# Patient Record
Sex: FEMALE | Race: WHITE | NOT HISPANIC OR LATINO | Employment: PART TIME | URBAN - METROPOLITAN AREA
[De-identification: names, ages, dates, MRNs, and addresses within clinical notes are randomized per-mention and may not be internally consistent; named-entity substitution may affect disease eponyms.]

---

## 2018-05-15 ENCOUNTER — OFFICE VISIT CONVERTED (OUTPATIENT)
Dept: FAMILY MEDICINE CLINIC | Facility: CLINIC | Age: 28
End: 2018-05-15
Attending: NURSE PRACTITIONER

## 2018-08-24 ENCOUNTER — CONVERSION ENCOUNTER (OUTPATIENT)
Dept: FAMILY MEDICINE CLINIC | Facility: CLINIC | Age: 28
End: 2018-08-24

## 2018-08-24 ENCOUNTER — OFFICE VISIT CONVERTED (OUTPATIENT)
Dept: FAMILY MEDICINE CLINIC | Facility: CLINIC | Age: 28
End: 2018-08-24
Attending: NURSE PRACTITIONER

## 2018-11-06 ENCOUNTER — OFFICE VISIT CONVERTED (OUTPATIENT)
Dept: FAMILY MEDICINE CLINIC | Facility: CLINIC | Age: 28
End: 2018-11-06
Attending: NURSE PRACTITIONER

## 2020-02-11 ENCOUNTER — OFFICE VISIT CONVERTED (OUTPATIENT)
Dept: FAMILY MEDICINE CLINIC | Facility: CLINIC | Age: 30
End: 2020-02-11
Attending: NURSE PRACTITIONER

## 2020-02-11 ENCOUNTER — HOSPITAL ENCOUNTER (OUTPATIENT)
Dept: LAB | Facility: HOSPITAL | Age: 30
Discharge: HOME | End: 2020-02-11
Attending: NURSE PRACTITIONER

## 2020-02-12 LAB
CONV MUMPS ANTIBODY IGG: 27.4 AU/ML
HBV SURFACE AB SER QL: REACTIVE
MEV IGG SER IA-ACNC: 271 AU/ML
RUBV IGG SER-ACNC: 34.16 [IU]/ML
VZV IGG SER IA-ACNC: >4000 INDEX

## 2020-06-04 ENCOUNTER — HOSPITAL ENCOUNTER (OUTPATIENT)
Dept: OTHER | Facility: HOSPITAL | Age: 30
Discharge: HOME | End: 2020-06-04
Attending: NURSE PRACTITIONER

## 2020-06-05 ENCOUNTER — CONVERSION ENCOUNTER (OUTPATIENT)
Dept: FAMILY MEDICINE CLINIC | Facility: CLINIC | Age: 30
End: 2020-06-05

## 2020-06-05 ENCOUNTER — OFFICE VISIT CONVERTED (OUTPATIENT)
Dept: FAMILY MEDICINE CLINIC | Facility: CLINIC | Age: 30
End: 2020-06-05
Attending: NURSE PRACTITIONER

## 2020-06-06 LAB
CONV MUMPS ANTIBODY IGG: 28.4 AU/ML
HBV SURFACE AB SER QL: REACTIVE
MEV IGG SER IA-ACNC: >300 AU/ML
RUBV IGG SER-ACNC: 32.27 [IU]/ML
VZV IGG SER IA-ACNC: 2016 INDEX

## 2021-05-13 NOTE — PROGRESS NOTES
"   Progress Note      Patient Name: Loretta Layton   Patient ID: [id number]   Sex: Female   YOB: 1990        Visit Date: 2020    Provider: ARIS Cevallos   Location: [facility]   Location Address: [address] [place] [address], [address] [place] [address]  [id number]   Location Phone: [phone]          Chief Complaint  · School CPX      History Of Present Illness  Loretta Layton is a 30 year old /White female who presents for evaluation and treatment of:      Patient presents to the office today for a physical required by her school.  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  She denies needing any immunizations at this time.  She states that she did have her PPD.  She states that she will notify the office if there is any other requirements that she is needing.  She denies any other concerns or complaints at this time       Past Medical History  Broken Bones; Head injury; Ketonuria         Past Surgical History  *No Past Surgical History         Allergy List  NO KNOWN DRUG ALLERGIES         Family Medical History  Stroke; TIA         Reproductive History   2 Para 0 0 0 2       Social History  Alcohol (Never); Denies substance abuse; ; Moderate Amount of Exercise (1-3 times weekly); Tobacco (Never)         Immunizations  Name Date Admin   Tdap          Review of Systems  · Constitutional  o Denies  o : fever, fatigue, weight loss, weight gain  · Cardiovascular  o Denies  o : lower extremity edema, claudication, chest pressure, palpitations  · Respiratory  o Denies  o : shortness of breath, wheezing, cough, hemoptysis, dyspnea on exertion  · Gastrointestinal  o Denies  o : nausea, vomiting, diarrhea, constipation, abdominal pain      Vitals  Date Time BP Position Site L\R Cuff Size HR RR TEMP (F) WT  HT  BMI kg/m2 BSA m2 O2 Sat        2020 03:03 /78 Sitting    77 - R 16 98.2 189lbs 0oz 5'  1\" 35.71 1.92 96 %          Physical " Examination  · Constitutional  o Appearance  o : well-nourished, in no acute distress  · Eyes  o Conjunctivae  o : conjunctivae normal  o Sclerae  o : sclerae white  o Pupils and Irises  o : pupils equal and round  o Eyelids/Ocular Adnexae  o : eyelid appearance normal, no exudates present  · Ears, Nose, Mouth and Throat  o Ears  o :   § External Ears  § : external auditory canal appearance within normal limits, no discharge present  § Otoscopic Examination  § : tympanic membrane appearance within normal limits bilaterally, cerumen not present  o Nose  o :   § External Nose  § : appearance normal  § Intranasal Exam  § : mucosa within normal limits, vestibules normal, no intranasal lesions present, septum midline, sinuses non tender to palpation  § Nasopharynx  § : no discharge present  o Oral Cavity  o :   § Oral Mucosa  § : oral mucosa normal  § Lips  § : lip appearance normal  § Teeth  § : normal dentation for age  o Throat  o :   § Oropharynx  § : no inflammation or lesions present, tonsils within normal limits  · Neck  o Inspection/Palpation  o : normal appearance, no masses or tenderness, trachea midline  o Range of Motion  o : cervical range of motion within normal limits  o Thyroid  o : gland size normal, nontender, no nodules or masses present on palpation  · Respiratory  o Respiratory Effort  o : breathing unlabored  o Inspection of Chest  o : normal appearance  o Auscultation of Lungs  o : normal breath sounds throughout inspiration and expiration  · Cardiovascular  o Heart  o :   § Auscultation of Heart  § : regular rate and rhythm, no murmurs, gallops or rubs  o Peripheral Vascular System  o :   § Extremities  § : no clubbing or edema  · Gastrointestinal  o Abdominal Examination  o : abdomen nontender to palpation, tone normal without rigidity or guarding, no masses present, bowel sounds present in all four quadrants  · Lymphatic  o Neck  o : no lymphadenopathy present  · Musculoskeletal  o Spine  o :    § Inspection/Palpation  § : no spinal tenderness, scoliosis or kyphosis present  § Range of Motion  § : spine range of motion normal  o Right Upper Extremity  o :   § Inspection/Palpation  § : no tenderness to palpation, ROM normal  o Left Upper Extremity  o :   § Inspection/Palpation  § : no tenderness to palpation, ROM normal  o Right Lower Extremity  o :   § Inspection/Palpation  § : no joint or limb tenderness to palpation, ROM normal  o Left Lower Extremity  o :   § Inspection/Palpation  § : no joint or limb tenderness to palpation, ROM normal  · Skin and Subcutaneous Tissue  o General Inspection  o : no rashes or lesions present, no areas of discoloration  o Body Hair  o : hair normal for age, general body hair distribution normal for age  o Digits and Nails  o : no clubbing, cyanosis, deformities or edema present, normal appearing nails  · Neurologic  o Mental Status Examination  o :   § Orientation  § : grossly oriented to person, place and time  o Gait and Station  o : normal gait, able to stand without difficulty  · Psychiatric  o Judgement and Insight  o : judgment and insight intact  o Mood and Affect  o : mood normal, affect appropriate  o Presence of Abnormal Thoughts  o : no hallucinations, no delusions present, no psychotic thoughts          Results  · In-Office Procedures  o Medical procedure  § IOP - Snellen Vision Test (65942)   § Wearing glasses or contacts?: No   § OS (Left): 20/13   § OD (Right): 20/20       Assessment  · Well adult exam     V70.0/Z00.00      Plan  · Orders  o ACO-39: Current medications updated and reviewed () - - 06/05/2020  o ACO-14: Influenza immunization was not administered for reasons documented () - - 06/05/2020  · Medications  o Medications have been Reconciled  o Transition of Care or Provider Policy  · Instructions  o Patient was educated/instructed on their diagnosis, treatment and medications prior to discharge from the clinic today.  o Time spent with  the patient was minutes, more than 50% face to face.  o Electronically Identified Patient Education Materials Provided Electronically  · Disposition  o Call or Return if symptoms worsen or persist.            Electronically Signed by: ARIS Cevallos -Author on June 5, 2020 03:54:43 PM

## 2021-05-15 VITALS
SYSTOLIC BLOOD PRESSURE: 118 MMHG | OXYGEN SATURATION: 95 % | HEART RATE: 115 BPM | BODY MASS INDEX: 33.23 KG/M2 | WEIGHT: 176 LBS | DIASTOLIC BLOOD PRESSURE: 74 MMHG | RESPIRATION RATE: 16 BRPM | TEMPERATURE: 97.9 F | HEIGHT: 61 IN

## 2021-05-15 VITALS
SYSTOLIC BLOOD PRESSURE: 116 MMHG | WEIGHT: 189 LBS | HEIGHT: 61 IN | RESPIRATION RATE: 16 BRPM | DIASTOLIC BLOOD PRESSURE: 78 MMHG | HEART RATE: 77 BPM | OXYGEN SATURATION: 96 % | BODY MASS INDEX: 35.68 KG/M2 | TEMPERATURE: 98.2 F

## 2021-05-16 VITALS
BODY MASS INDEX: 32.28 KG/M2 | RESPIRATION RATE: 16 BRPM | DIASTOLIC BLOOD PRESSURE: 84 MMHG | SYSTOLIC BLOOD PRESSURE: 121 MMHG | WEIGHT: 171 LBS | TEMPERATURE: 98.7 F | HEIGHT: 61 IN | OXYGEN SATURATION: 97 % | HEART RATE: 90 BPM

## 2021-05-16 VITALS
HEIGHT: 61 IN | SYSTOLIC BLOOD PRESSURE: 132 MMHG | WEIGHT: 175.31 LBS | RESPIRATION RATE: 16 BRPM | DIASTOLIC BLOOD PRESSURE: 79 MMHG | OXYGEN SATURATION: 97 % | TEMPERATURE: 97.5 F | HEART RATE: 88 BPM | BODY MASS INDEX: 33.1 KG/M2

## 2021-06-16 ENCOUNTER — TELEPHONE (OUTPATIENT)
Dept: FAMILY MEDICINE CLINIC | Facility: CLINIC | Age: 31
End: 2021-06-16

## 2021-06-16 NOTE — TELEPHONE ENCOUNTER
Caller: Loretta Layton    Relationship to patient: Self    Best call back number: [phone]    Chief complaint: PATIENT NEEDS A TUBERCULIN SKIN TEST DONE BEFORE 06/30/2021. PATIENT THOUGHT THAT SHE COULD WALK IN BUT WAS TOLD TO MAKE AN APPOINTMENT.    Type of visit: OFFICE VISIT    Requested date: 06/16/2021    If rescheduling, when is the original appointment: N/A    Additional notes: PATIENT IS REQUESTING A CALLBACK

## 2022-04-06 ENCOUNTER — LAB (OUTPATIENT)
Dept: LAB | Facility: HOSPITAL | Age: 32
End: 2022-04-06

## 2022-04-06 ENCOUNTER — OFFICE VISIT (OUTPATIENT)
Dept: FAMILY MEDICINE CLINIC | Facility: CLINIC | Age: 32
End: 2022-04-06

## 2022-04-06 VITALS
TEMPERATURE: 98.6 F | RESPIRATION RATE: 18 BRPM | BODY MASS INDEX: 35.95 KG/M2 | HEIGHT: 61 IN | WEIGHT: 190.4 LBS | DIASTOLIC BLOOD PRESSURE: 70 MMHG | HEART RATE: 75 BPM | OXYGEN SATURATION: 98 % | SYSTOLIC BLOOD PRESSURE: 121 MMHG

## 2022-04-06 DIAGNOSIS — M54.9 OTHER ACUTE BACK PAIN: ICD-10-CM

## 2022-04-06 DIAGNOSIS — Z13.220 SCREENING FOR LIPID DISORDERS: ICD-10-CM

## 2022-04-06 DIAGNOSIS — Z13.29 SCREENING FOR THYROID DISORDER: ICD-10-CM

## 2022-04-06 DIAGNOSIS — M62.830 BACK MUSCLE SPASM: Primary | ICD-10-CM

## 2022-04-06 DIAGNOSIS — M54.9 BACK PAIN DUE TO INJURY: ICD-10-CM

## 2022-04-06 PROBLEM — R82.4 KETONURIA: Status: ACTIVE | Noted: 2022-04-06

## 2022-04-06 PROBLEM — R82.4 KETONURIA: Status: RESOLVED | Noted: 2022-04-06 | Resolved: 2022-04-06

## 2022-04-06 PROBLEM — S09.90XA HEAD INJURY: Status: ACTIVE | Noted: 2022-04-06

## 2022-04-06 PROBLEM — T14.8XXA BROKEN BONES: Status: ACTIVE | Noted: 2022-04-06

## 2022-04-06 PROBLEM — T14.8XXA BROKEN BONES: Status: RESOLVED | Noted: 2022-04-06 | Resolved: 2022-04-06

## 2022-04-06 LAB
ALBUMIN SERPL-MCNC: 4 G/DL (ref 3.5–5.2)
ALBUMIN/GLOB SERPL: 0.9 G/DL
ALP SERPL-CCNC: 78 U/L (ref 39–117)
ALT SERPL W P-5'-P-CCNC: 23 U/L (ref 1–33)
ANION GAP SERPL CALCULATED.3IONS-SCNC: 9.5 MMOL/L (ref 5–15)
AST SERPL-CCNC: 19 U/L (ref 1–32)
BASOPHILS # BLD AUTO: 0.03 10*3/MM3 (ref 0–0.2)
BASOPHILS NFR BLD AUTO: 0.6 % (ref 0–1.5)
BILIRUB SERPL-MCNC: 0.2 MG/DL (ref 0–1.2)
BUN SERPL-MCNC: 14 MG/DL (ref 6–20)
BUN/CREAT SERPL: 21.5 (ref 7–25)
CALCIUM SPEC-SCNC: 9 MG/DL (ref 8.6–10.5)
CHLORIDE SERPL-SCNC: 106 MMOL/L (ref 98–107)
CHOLEST SERPL-MCNC: 219 MG/DL (ref 0–200)
CO2 SERPL-SCNC: 21.5 MMOL/L (ref 22–29)
CREAT SERPL-MCNC: 0.65 MG/DL (ref 0.57–1)
DEPRECATED RDW RBC AUTO: 39.1 FL (ref 37–54)
EGFRCR SERPLBLD CKD-EPI 2021: 119.4 ML/MIN/1.73
EOSINOPHIL # BLD AUTO: 0.2 10*3/MM3 (ref 0–0.4)
EOSINOPHIL NFR BLD AUTO: 4.3 % (ref 0.3–6.2)
ERYTHROCYTE [DISTWIDTH] IN BLOOD BY AUTOMATED COUNT: 12.2 % (ref 12.3–15.4)
GLOBULIN UR ELPH-MCNC: 4.3 GM/DL
GLUCOSE SERPL-MCNC: 91 MG/DL (ref 65–99)
HCT VFR BLD AUTO: 36.9 % (ref 34–46.6)
HDLC SERPL-MCNC: 55 MG/DL (ref 40–60)
HGB BLD-MCNC: 12 G/DL (ref 12–15.9)
IMM GRANULOCYTES # BLD AUTO: 0.01 10*3/MM3 (ref 0–0.05)
IMM GRANULOCYTES NFR BLD AUTO: 0.2 % (ref 0–0.5)
LDLC SERPL CALC-MCNC: 152 MG/DL (ref 0–100)
LDLC/HDLC SERPL: 2.73 {RATIO}
LYMPHOCYTES # BLD AUTO: 1.54 10*3/MM3 (ref 0.7–3.1)
LYMPHOCYTES NFR BLD AUTO: 33.2 % (ref 19.6–45.3)
MCH RBC QN AUTO: 28.4 PG (ref 26.6–33)
MCHC RBC AUTO-ENTMCNC: 32.5 G/DL (ref 31.5–35.7)
MCV RBC AUTO: 87.4 FL (ref 79–97)
MONOCYTES # BLD AUTO: 0.38 10*3/MM3 (ref 0.1–0.9)
MONOCYTES NFR BLD AUTO: 8.2 % (ref 5–12)
NEUTROPHILS NFR BLD AUTO: 2.48 10*3/MM3 (ref 1.7–7)
NEUTROPHILS NFR BLD AUTO: 53.5 % (ref 42.7–76)
NRBC BLD AUTO-RTO: 0 /100 WBC (ref 0–0.2)
PLATELET # BLD AUTO: 175 10*3/MM3 (ref 140–450)
PMV BLD AUTO: 11.9 FL (ref 6–12)
POTASSIUM SERPL-SCNC: 4.6 MMOL/L (ref 3.5–5.2)
PROT SERPL-MCNC: 8.3 G/DL (ref 6–8.5)
RBC # BLD AUTO: 4.22 10*6/MM3 (ref 3.77–5.28)
SODIUM SERPL-SCNC: 137 MMOL/L (ref 136–145)
TRIGL SERPL-MCNC: 68 MG/DL (ref 0–150)
TSH SERPL DL<=0.05 MIU/L-ACNC: 2.52 UIU/ML (ref 0.27–4.2)
VLDLC SERPL-MCNC: 12 MG/DL (ref 5–40)
WBC NRBC COR # BLD: 4.64 10*3/MM3 (ref 3.4–10.8)

## 2022-04-06 PROCEDURE — 99204 OFFICE O/P NEW MOD 45 MIN: CPT

## 2022-04-06 PROCEDURE — 80050 GENERAL HEALTH PANEL: CPT

## 2022-04-06 PROCEDURE — 80061 LIPID PANEL: CPT

## 2022-04-06 PROCEDURE — 36415 COLL VENOUS BLD VENIPUNCTURE: CPT

## 2022-04-06 RX ORDER — CYCLOBENZAPRINE HCL 10 MG
10 TABLET ORAL 3 TIMES DAILY PRN
Qty: 30 TABLET | Refills: 0 | Status: SHIPPED | OUTPATIENT
Start: 2022-04-06 | End: 2022-04-16

## 2022-04-06 NOTE — PROGRESS NOTES
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    • Establish Care     Was going to [facility].        Subjective          Loretta Layton presents to [facility]    She is here today to establish care.     She was previously with Bishnu Mcmahon.     She does not smoke. She states she did smoke here and there as a teenager. She has two children ages 15 and 12 that she had vaginally delivered. She is . She has no history of any surgeries.     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 She is having some constant pain in her right back/side area. She states she is having trouble sleeping at night due to the pain.       Past History:  Medical History: has a past medical history of Anxiety, Broken bones (4/6/2022), and Ketonuria (4/6/2022).   Surgical History: has no past surgical history on file.   Family History: family history is not on file.   Social History: reports that she has never smoked. She has never used smokeless tobacco. She reports previous alcohol use. She reports that she does not use drugs.  Allergies: Patient has no known allergies.  (Not in a hospital admission)       Social History     Socioeconomic History   • Marital status:    Tobacco Use   • Smoking status: Never Smoker   • Smokeless tobacco: Never Used   Substance and Sexual Activity   • Alcohol use: Not Currently   • Drug use: Never   • Sexual activity: Defer       There are no preventive care reminders to display for this patient.    Objective     Vital Signs:   /70 (BP Location: Left arm, Patient Position: Sitting)   Pulse 75   Temp 98.6 °F (37 °C) (Temporal)   Resp 18   Ht 154.9 cm (61\")   Wt 86.4 kg (190 lb 6.4 oz)   SpO2 98%   BMI 35.98 kg/m²       Physical Exam  Constitutional:       Appearance: Normal appearance. "   HENT:      Nose: Nose normal.      Mouth/Throat:      Mouth: Mucous membranes are moist.   Cardiovascular:      Rate and Rhythm: Normal rate and regular rhythm.      Pulses: Normal pulses.      Heart sounds: Normal heart sounds.   Pulmonary:      Effort: Pulmonary effort is normal.      Breath sounds: Normal breath sounds.   Musculoskeletal:         General: Signs of injury (back spasm) present.   Skin:     General: Skin is warm and dry.   Neurological:      General: No focal deficit present.      Mental Status: She is alert and oriented to person, place, and time.   Psychiatric:         Mood and Affect: Mood normal.         Behavior: Behavior normal.          Review of Systems   Musculoskeletal: Positive for arthralgias.        Result Review :                 Assessment and Plan    Diagnoses and all orders for this visit:    1. Back muscle spasm (Primary)  -     cyclobenzaprine (FLEXERIL) 10 MG tablet; Take 1 tablet by mouth 3 (Three) Times a Day As Needed for Muscle Spasms for up to 10 days.  Dispense: 30 tablet; Refill: 0    2. Other acute back pain  -     cyclobenzaprine (FLEXERIL) 10 MG tablet; Take 1 tablet by mouth 3 (Three) Times a Day As Needed for Muscle Spasms for up to 10 days.  Dispense: 30 tablet; Refill: 0  -     CBC & Differential; Future  -     Comprehensive Metabolic Panel; Future    3. Back pain due to injury    4. Screening for lipid disorders  -     Lipid Panel; Future    5. Screening for thyroid disorder  -     TSH; Future    I am sending her Flexeril for back pain due to strain. She is having the ongoing muscle pain.         Pt thought to be clinically stable at this time.    Follow Up   Return if symptoms worsen or fail to improve.  Patient was given instructions and counseling regarding her condition or for health maintenance advice. Please see specific information pulled into the AVS if appropriate.

## 2022-04-07 ENCOUNTER — PATIENT ROUNDING (BHMG ONLY) (OUTPATIENT)
Dept: FAMILY MEDICINE CLINIC | Facility: CLINIC | Age: 32
End: 2022-04-07

## 2022-04-07 NOTE — PROGRESS NOTES
April 7, 2022    Hello, may I speak with Loretta Layton?    My name is Chanel    I am  with [facility]  [address] [place] [id number] [phone].    Before we get started may I verify your date of birth? 1990    I am calling to officially welcome you to our practice and ask about your recent visit. Is this a good time to talk? Left voicemail    Tell me about your visit with us. What things went well?        We're always looking for ways to make our patients' experiences even better. Do you have recommendations on ways we may improve?    Overall were you satisfied with your first visit to our practice?        I appreciate you taking the time to speak with me today. Is there anything else I can do for you?       Thank you, and have a great day.

## 2022-04-08 NOTE — PROGRESS NOTES
Let her know her cholesterol is slightly elevated at 218 and her ldl is elevated at 152. She should make some lifestyle modifications to include healthier diet and exercise. We will recheck this in about 6 months to assess need for cholesterol medication

## 2022-09-09 ENCOUNTER — OFFICE VISIT (OUTPATIENT)
Dept: FAMILY MEDICINE CLINIC | Facility: CLINIC | Age: 32
End: 2022-09-09

## 2022-09-09 VITALS
OXYGEN SATURATION: 97 % | BODY MASS INDEX: 33.49 KG/M2 | TEMPERATURE: 97.7 F | DIASTOLIC BLOOD PRESSURE: 81 MMHG | SYSTOLIC BLOOD PRESSURE: 128 MMHG | HEART RATE: 94 BPM | WEIGHT: 177.4 LBS | HEIGHT: 61 IN

## 2022-09-09 DIAGNOSIS — F41.9 ANXIETY: Primary | ICD-10-CM

## 2022-09-09 DIAGNOSIS — R05.9 COUGH: ICD-10-CM

## 2022-09-09 PROCEDURE — 99214 OFFICE O/P EST MOD 30 MIN: CPT

## 2022-09-09 RX ORDER — BENZONATATE 100 MG/1
100 CAPSULE ORAL 3 TIMES DAILY PRN
Qty: 21 CAPSULE | Refills: 0 | Status: SHIPPED | OUTPATIENT
Start: 2022-09-09 | End: 2022-09-16

## 2022-09-09 RX ORDER — HYDROXYZINE HYDROCHLORIDE 25 MG/1
25 TABLET, FILM COATED ORAL 3 TIMES DAILY PRN
Qty: 90 TABLET | Refills: 0 | Status: SHIPPED | OUTPATIENT
Start: 2022-09-09 | End: 2022-10-09

## 2022-09-09 NOTE — PROGRESS NOTES
"Chief Complaint   Patient presents with   • Anxiety     Wants to discuss anxiety medications.        Subjective          Loretta Layton presents to [facility]    She is here to be seen for anxiety. She used to take zoloft previously and has been off of it for awhile. Last July her  was in an accident where he almost . 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 She started seeing a therapist in January. She thought that maybe after school the anxiety would subside a little. She said the therapist suggested at this point to see about medication. About 6 weeks ago she states she started having panic attacks. She has been taking Wellbutrin that her weight loss specialist provided to her until she could get in here. She also prescribed her trazodone for sleep. Her pulse is on the higher end at 94 BPM.       Past History:  Medical History: has a past medical history of Anxiety, Broken bones (2022), and Ketonuria (2022).   Surgical History: has no past surgical history on file.   Family History: family history is not on file.   Social History: reports that she has never smoked. She has never used smokeless tobacco. She reports previous alcohol use. She reports that she does not use drugs.  Allergies: Patient has no known allergies.  (Not in a hospital admission)       Social History     Socioeconomic History   • Marital status:    Tobacco Use   • Smoking status: Never Smoker   • Smokeless tobacco: Never Used   Substance and Sexual Activity   • Alcohol use: Not Currently   • Drug use: Never   • Sexual activity: Defer       There are no preventive care reminders to display for this patient.    Objective     Vital Signs:   /81 (BP Location: Left arm, Patient Position: Sitting)   Pulse 94   Temp 97.7 °F (36.5 °C) (Infrared)   Ht 154.9 cm (61\")   Wt 80.5 kg (177 lb 6.4 oz)   SpO2 97%   BMI 33.52 kg/m²       Physical " Exam  Constitutional:       Appearance: Normal appearance.   HENT:      Nose: Nose normal.      Mouth/Throat:      Mouth: Mucous membranes are moist.   Cardiovascular:      Rate and Rhythm: Normal rate.      Pulses: Normal pulses.   Pulmonary:      Effort: Pulmonary effort is normal.   Skin:     General: Skin is warm and dry.   Neurological:      General: No focal deficit present.      Mental Status: She is alert and oriented to person, place, and time.   Psychiatric:         Mood and Affect: Mood is anxious.         Behavior: Behavior normal.          Review of Systems   Psychiatric/Behavioral: The patient is nervous/anxious.    All other systems reviewed and are negative.       Result Review :                 Assessment and Plan    Diagnoses and all orders for this visit:    1. Anxiety (Primary)  -     hydrOXYzine (ATARAX) 25 MG tablet; Take 1 tablet by mouth 3 (Three) Times a Day As Needed for Anxiety for up to 30 days.  Dispense: 90 tablet; Refill: 0    2. Cough  -     benzonatate (Tessalon Perles) 100 MG capsule; Take 1 capsule by mouth 3 (Three) Times a Day As Needed for Cough for up to 7 days.  Dispense: 21 capsule; Refill: 0          Pt thought to be clinically stable at this time.    Follow Up   No follow-ups on file.  Patient was given instructions and counseling regarding her condition or for health maintenance advice. Please see specific information pulled into the AVS if appropriate.

## 2022-09-28 DIAGNOSIS — R00.2 PALPITATIONS: ICD-10-CM

## 2022-09-28 DIAGNOSIS — F41.9 ANXIETY: Primary | ICD-10-CM

## 2022-09-28 RX ORDER — PROPRANOLOL HYDROCHLORIDE 10 MG/1
10 TABLET ORAL 2 TIMES DAILY
Qty: 60 TABLET | Refills: 0 | Status: SHIPPED | OUTPATIENT
Start: 2022-09-28 | End: 2022-12-08

## 2022-11-19 ENCOUNTER — HOSPITAL ENCOUNTER (OUTPATIENT)
Facility: HOSPITAL | Age: 32
Setting detail: HOSPITAL OUTPATIENT SURGERY
Discharge: HOME | End: 2022-11-20
Attending: EMERGENCY MEDICINE | Admitting: SURGERY

## 2022-11-19 ENCOUNTER — APPOINTMENT (OUTPATIENT)
Dept: GENERAL RADIOLOGY | Facility: HOSPITAL | Age: 32
End: 2022-11-19

## 2022-11-19 DIAGNOSIS — K80.50 COMMON BILE DUCT STONE: ICD-10-CM

## 2022-11-19 DIAGNOSIS — K81.0 ACUTE CHOLECYSTITIS: Primary | ICD-10-CM

## 2022-11-19 LAB
BASOPHILS # BLD AUTO: 0.03 10*3/MM3 (ref 0–0.2)
BASOPHILS NFR BLD AUTO: 0.3 % (ref 0–1.5)
DEPRECATED RDW RBC AUTO: 45.1 FL (ref 37–54)
EOSINOPHIL # BLD AUTO: 0.1 10*3/MM3 (ref 0–0.4)
EOSINOPHIL NFR BLD AUTO: 1.1 % (ref 0.3–6.2)
ERYTHROCYTE [DISTWIDTH] IN BLOOD BY AUTOMATED COUNT: 14.6 % (ref 12.3–15.4)
HCT VFR BLD AUTO: 44.2 % (ref 34–46.6)
HGB BLD-MCNC: 14.6 G/DL (ref 12–15.9)
IMM GRANULOCYTES # BLD AUTO: 0.03 10*3/MM3 (ref 0–0.05)
IMM GRANULOCYTES NFR BLD AUTO: 0.3 % (ref 0–0.5)
LYMPHOCYTES # BLD AUTO: 1.22 10*3/MM3 (ref 0.7–3.1)
LYMPHOCYTES NFR BLD AUTO: 13.7 % (ref 19.6–45.3)
MCH RBC QN AUTO: 28.1 PG (ref 26.6–33)
MCHC RBC AUTO-ENTMCNC: 33 G/DL (ref 31.5–35.7)
MCV RBC AUTO: 85.2 FL (ref 79–97)
MONOCYTES # BLD AUTO: 0.57 10*3/MM3 (ref 0.1–0.9)
MONOCYTES NFR BLD AUTO: 6.4 % (ref 5–12)
NEUTROPHILS NFR BLD AUTO: 6.93 10*3/MM3 (ref 1.7–7)
NEUTROPHILS NFR BLD AUTO: 78.2 % (ref 42.7–76)
NRBC BLD AUTO-RTO: 0 /100 WBC (ref 0–0.2)
NT-PROBNP SERPL-MCNC: 69.8 PG/ML (ref 0–450)
PLATELET # BLD AUTO: 401 10*3/MM3 (ref 140–450)
PMV BLD AUTO: 9.6 FL (ref 6–12)
RBC # BLD AUTO: 5.19 10*6/MM3 (ref 3.77–5.28)
TROPONIN I SERPL-MCNC: 0 NG/ML (ref 0–0.08)
WBC NRBC COR # BLD: 8.88 10*3/MM3 (ref 3.4–10.8)

## 2022-11-19 PROCEDURE — 83735 ASSAY OF MAGNESIUM: CPT | Performed by: EMERGENCY MEDICINE

## 2022-11-19 PROCEDURE — 80053 COMPREHEN METABOLIC PANEL: CPT | Performed by: EMERGENCY MEDICINE

## 2022-11-19 PROCEDURE — 71045 X-RAY EXAM CHEST 1 VIEW: CPT

## 2022-11-19 PROCEDURE — 83690 ASSAY OF LIPASE: CPT | Performed by: EMERGENCY MEDICINE

## 2022-11-19 PROCEDURE — 36415 COLL VENOUS BLD VENIPUNCTURE: CPT

## 2022-11-19 PROCEDURE — 85025 COMPLETE CBC W/AUTO DIFF WBC: CPT | Performed by: EMERGENCY MEDICINE

## 2022-11-19 PROCEDURE — 93005 ELECTROCARDIOGRAM TRACING: CPT

## 2022-11-19 PROCEDURE — 93005 ELECTROCARDIOGRAM TRACING: CPT | Performed by: EMERGENCY MEDICINE

## 2022-11-19 PROCEDURE — 84703 CHORIONIC GONADOTROPIN ASSAY: CPT | Performed by: EMERGENCY MEDICINE

## 2022-11-19 PROCEDURE — 83880 ASSAY OF NATRIURETIC PEPTIDE: CPT | Performed by: EMERGENCY MEDICINE

## 2022-11-19 PROCEDURE — 84484 ASSAY OF TROPONIN QUANT: CPT

## 2022-11-19 RX ORDER — LORAZEPAM 2 MG/ML
1 INJECTION INTRAMUSCULAR ONCE
Status: COMPLETED | OUTPATIENT
Start: 2022-11-20 | End: 2022-11-20

## 2022-11-19 RX ORDER — FAMOTIDINE 10 MG/ML
20 INJECTION, SOLUTION INTRAVENOUS ONCE
Status: COMPLETED | OUTPATIENT
Start: 2022-11-20 | End: 2022-11-20

## 2022-11-19 RX ORDER — ASPIRIN 81 MG/1
324 TABLET, CHEWABLE ORAL ONCE
Status: DISCONTINUED | OUTPATIENT
Start: 2022-11-19 | End: 2022-11-20

## 2022-11-19 RX ORDER — SODIUM CHLORIDE 0.9 % (FLUSH) 0.9 %
10 SYRINGE (ML) INJECTION AS NEEDED
Status: DISCONTINUED | OUTPATIENT
Start: 2022-11-19 | End: 2022-11-20 | Stop reason: HOSPADM

## 2022-11-20 ENCOUNTER — APPOINTMENT (OUTPATIENT)
Dept: GENERAL RADIOLOGY | Facility: HOSPITAL | Age: 32
End: 2022-11-20

## 2022-11-20 ENCOUNTER — ANESTHESIA EVENT (OUTPATIENT)
Dept: PERIOP | Facility: HOSPITAL | Age: 32
End: 2022-11-20

## 2022-11-20 ENCOUNTER — APPOINTMENT (OUTPATIENT)
Dept: CT IMAGING | Facility: HOSPITAL | Age: 32
End: 2022-11-20

## 2022-11-20 ENCOUNTER — ANESTHESIA (OUTPATIENT)
Dept: PERIOP | Facility: HOSPITAL | Age: 32
End: 2022-11-20

## 2022-11-20 ENCOUNTER — APPOINTMENT (OUTPATIENT)
Dept: ULTRASOUND IMAGING | Facility: HOSPITAL | Age: 32
End: 2022-11-20

## 2022-11-20 VITALS
DIASTOLIC BLOOD PRESSURE: 72 MMHG | HEIGHT: 61 IN | HEART RATE: 82 BPM | RESPIRATION RATE: 16 BRPM | BODY MASS INDEX: 31.09 KG/M2 | TEMPERATURE: 97.6 F | WEIGHT: 164.68 LBS | SYSTOLIC BLOOD PRESSURE: 99 MMHG | OXYGEN SATURATION: 100 %

## 2022-11-20 PROBLEM — K81.0 ACUTE CHOLECYSTITIS: Status: ACTIVE | Noted: 2022-11-20

## 2022-11-20 PROBLEM — K81.0 ACUTE CHOLECYSTITIS: Status: RESOLVED | Noted: 2022-11-20 | Resolved: 2022-11-20

## 2022-11-20 PROBLEM — K80.50 COMMON BILE DUCT STONE: Status: ACTIVE | Noted: 2022-11-20

## 2022-11-20 LAB
ALBUMIN SERPL-MCNC: 4.4 G/DL (ref 3.5–5.2)
ALBUMIN/GLOB SERPL: 1 G/DL
ALP SERPL-CCNC: 183 U/L (ref 39–117)
ALT SERPL W P-5'-P-CCNC: 373 U/L (ref 1–33)
ANION GAP SERPL CALCULATED.3IONS-SCNC: 9.9 MMOL/L (ref 5–15)
AST SERPL-CCNC: 776 U/L (ref 1–32)
BILIRUB SERPL-MCNC: 1 MG/DL (ref 0–1.2)
BUN SERPL-MCNC: 10 MG/DL (ref 6–20)
BUN/CREAT SERPL: 13.5 (ref 7–25)
CALCIUM SPEC-SCNC: 9.6 MG/DL (ref 8.6–10.5)
CHLORIDE SERPL-SCNC: 102 MMOL/L (ref 98–107)
CO2 SERPL-SCNC: 25.1 MMOL/L (ref 22–29)
CREAT SERPL-MCNC: 0.74 MG/DL (ref 0.57–1)
EGFRCR SERPLBLD CKD-EPI 2021: 109.7 ML/MIN/1.73
GLOBULIN UR ELPH-MCNC: 4.6 GM/DL
GLUCOSE SERPL-MCNC: 120 MG/DL (ref 65–99)
HCG SERPL QL: NEGATIVE
HOLD SPECIMEN: NORMAL
LIPASE SERPL-CCNC: 80 U/L (ref 13–60)
MAGNESIUM SERPL-MCNC: 1.9 MG/DL (ref 1.6–2.6)
POTASSIUM SERPL-SCNC: 4.5 MMOL/L (ref 3.5–5.2)
PROT SERPL-MCNC: 9 G/DL (ref 6–8.5)
QT INTERVAL: 358 MS
QT INTERVAL: 373 MS
SODIUM SERPL-SCNC: 137 MMOL/L (ref 136–145)
WHOLE BLOOD HOLD COAG: NORMAL
WHOLE BLOOD HOLD SPECIMEN: NORMAL

## 2022-11-20 PROCEDURE — 99284 EMERGENCY DEPT VISIT MOD MDM: CPT

## 2022-11-20 PROCEDURE — 93005 ELECTROCARDIOGRAM TRACING: CPT | Performed by: EMERGENCY MEDICINE

## 2022-11-20 PROCEDURE — 76000 FLUOROSCOPY <1 HR PHYS/QHP: CPT

## 2022-11-20 PROCEDURE — 25010000002 LORAZEPAM PER 2 MG: Performed by: EMERGENCY MEDICINE

## 2022-11-20 PROCEDURE — 25010000002 PROPOFOL 10 MG/ML EMULSION

## 2022-11-20 PROCEDURE — 25010000002 PIPERACILLIN SOD-TAZOBACTAM PER 1 G: Performed by: EMERGENCY MEDICINE

## 2022-11-20 PROCEDURE — 96375 TX/PRO/DX INJ NEW DRUG ADDON: CPT

## 2022-11-20 PROCEDURE — 25010000002 DEXAMETHASONE PER 1 MG

## 2022-11-20 PROCEDURE — 25010000002 KETOROLAC TROMETHAMINE PER 15 MG

## 2022-11-20 PROCEDURE — 76705 ECHO EXAM OF ABDOMEN: CPT

## 2022-11-20 PROCEDURE — 0 IOPAMIDOL PER 1 ML: Performed by: SURGERY

## 2022-11-20 PROCEDURE — 74177 CT ABD & PELVIS W/CONTRAST: CPT

## 2022-11-20 PROCEDURE — 74300 X-RAY BILE DUCTS/PANCREAS: CPT

## 2022-11-20 PROCEDURE — 88304 TISSUE EXAM BY PATHOLOGIST: CPT | Performed by: SURGERY

## 2022-11-20 PROCEDURE — 0 IOPAMIDOL PER 1 ML: Performed by: EMERGENCY MEDICINE

## 2022-11-20 PROCEDURE — 47563 LAPARO CHOLECYSTECTOMY/GRAPH: CPT | Performed by: SURGERY

## 2022-11-20 PROCEDURE — 96374 THER/PROPH/DIAG INJ IV PUSH: CPT

## 2022-11-20 PROCEDURE — 0 HYDROMORPHONE 1 MG/ML SOLUTION

## 2022-11-20 PROCEDURE — 25010000002 MIDAZOLAM PER 1 MG: Performed by: ANESTHESIOLOGY

## 2022-11-20 PROCEDURE — 25010000002 ONDANSETRON PER 1 MG

## 2022-11-20 PROCEDURE — 74018 RADEX ABDOMEN 1 VIEW: CPT

## 2022-11-20 DEVICE — ENDOPATH ETS45 2.5MM RELOADS (VASCULAR/THIN)
Type: IMPLANTABLE DEVICE | Site: ABDOMEN | Status: FUNCTIONAL
Brand: ENDOPATH

## 2022-11-20 DEVICE — LIGACLIP 10-M/L, 10MM ENDOSCOPIC ROTATING MULTIPLE CLIP APPLIERS
Type: IMPLANTABLE DEVICE | Site: ABDOMEN | Status: FUNCTIONAL
Brand: LIGACLIP

## 2022-11-20 RX ORDER — LIDOCAINE HYDROCHLORIDE 20 MG/ML
INJECTION, SOLUTION EPIDURAL; INFILTRATION; INTRACAUDAL; PERINEURAL AS NEEDED
Status: DISCONTINUED | OUTPATIENT
Start: 2022-11-20 | End: 2022-11-20 | Stop reason: SURG

## 2022-11-20 RX ORDER — PROMETHAZINE HYDROCHLORIDE 12.5 MG/1
25 TABLET ORAL ONCE AS NEEDED
Status: DISCONTINUED | OUTPATIENT
Start: 2022-11-20 | End: 2022-11-20 | Stop reason: HOSPADM

## 2022-11-20 RX ORDER — MORPHINE SULFATE 2 MG/ML
2 INJECTION, SOLUTION INTRAMUSCULAR; INTRAVENOUS
Status: DISCONTINUED | OUTPATIENT
Start: 2022-11-20 | End: 2022-11-20 | Stop reason: HOSPADM

## 2022-11-20 RX ORDER — ACETAMINOPHEN 500 MG
500 TABLET ORAL EVERY 4 HOURS PRN
Status: DISCONTINUED | OUTPATIENT
Start: 2022-11-20 | End: 2022-11-20 | Stop reason: HOSPADM

## 2022-11-20 RX ORDER — ESMOLOL HYDROCHLORIDE 10 MG/ML
INJECTION INTRAVENOUS AS NEEDED
Status: DISCONTINUED | OUTPATIENT
Start: 2022-11-20 | End: 2022-11-20 | Stop reason: SURG

## 2022-11-20 RX ORDER — PANTOPRAZOLE SODIUM 40 MG/1
40 TABLET, DELAYED RELEASE ORAL 2 TIMES DAILY PRN
Status: DISCONTINUED | OUTPATIENT
Start: 2022-11-20 | End: 2022-11-20 | Stop reason: HOSPADM

## 2022-11-20 RX ORDER — OXYCODONE HYDROCHLORIDE 5 MG/1
5 TABLET ORAL
Status: DISCONTINUED | OUTPATIENT
Start: 2022-11-20 | End: 2022-11-20 | Stop reason: HOSPADM

## 2022-11-20 RX ORDER — ACETAMINOPHEN 500 MG
1000 TABLET ORAL ONCE
Status: COMPLETED | OUTPATIENT
Start: 2022-11-20 | End: 2022-11-20

## 2022-11-20 RX ORDER — HYDROCODONE BITARTRATE AND ACETAMINOPHEN 5; 325 MG/1; MG/1
1 TABLET ORAL EVERY 6 HOURS PRN
Qty: 15 TABLET | Refills: 0 | Status: ON HOLD | OUTPATIENT
Start: 2022-11-20 | End: 2022-12-08

## 2022-11-20 RX ORDER — PROMETHAZINE HYDROCHLORIDE 25 MG/1
25 SUPPOSITORY RECTAL ONCE AS NEEDED
Status: DISCONTINUED | OUTPATIENT
Start: 2022-11-20 | End: 2022-11-20 | Stop reason: HOSPADM

## 2022-11-20 RX ORDER — ROCURONIUM BROMIDE 10 MG/ML
INJECTION, SOLUTION INTRAVENOUS AS NEEDED
Status: DISCONTINUED | OUTPATIENT
Start: 2022-11-20 | End: 2022-11-20 | Stop reason: SURG

## 2022-11-20 RX ORDER — HYDROCODONE BITARTRATE AND ACETAMINOPHEN 5; 325 MG/1; MG/1
1 TABLET ORAL EVERY 4 HOURS PRN
Status: DISCONTINUED | OUTPATIENT
Start: 2022-11-20 | End: 2022-11-20 | Stop reason: HOSPADM

## 2022-11-20 RX ORDER — LABETALOL HYDROCHLORIDE 5 MG/ML
INJECTION, SOLUTION INTRAVENOUS AS NEEDED
Status: DISCONTINUED | OUTPATIENT
Start: 2022-11-20 | End: 2022-11-20 | Stop reason: SURG

## 2022-11-20 RX ORDER — KETOROLAC TROMETHAMINE 15 MG/ML
15 INJECTION, SOLUTION INTRAMUSCULAR; INTRAVENOUS EVERY 6 HOURS PRN
Status: DISCONTINUED | OUTPATIENT
Start: 2022-11-20 | End: 2022-11-20 | Stop reason: HOSPADM

## 2022-11-20 RX ORDER — PHENYLEPHRINE HCL IN 0.9% NACL 1 MG/10 ML
SYRINGE (ML) INTRAVENOUS AS NEEDED
Status: DISCONTINUED | OUTPATIENT
Start: 2022-11-20 | End: 2022-11-20 | Stop reason: SURG

## 2022-11-20 RX ORDER — SODIUM CHLORIDE, SODIUM LACTATE, POTASSIUM CHLORIDE, CALCIUM CHLORIDE 600; 310; 30; 20 MG/100ML; MG/100ML; MG/100ML; MG/100ML
100 INJECTION, SOLUTION INTRAVENOUS CONTINUOUS
Status: DISCONTINUED | OUTPATIENT
Start: 2022-11-20 | End: 2022-11-20 | Stop reason: HOSPADM

## 2022-11-20 RX ORDER — ONDANSETRON 4 MG/1
4 TABLET, ORALLY DISINTEGRATING ORAL EVERY 6 HOURS PRN
Qty: 8 TABLET | Refills: 0 | Status: ON HOLD | OUTPATIENT
Start: 2022-11-20 | End: 2022-12-08

## 2022-11-20 RX ORDER — KETOROLAC TROMETHAMINE 30 MG/ML
INJECTION, SOLUTION INTRAMUSCULAR; INTRAVENOUS AS NEEDED
Status: DISCONTINUED | OUTPATIENT
Start: 2022-11-20 | End: 2022-11-20 | Stop reason: SURG

## 2022-11-20 RX ORDER — ONDANSETRON 2 MG/ML
INJECTION INTRAMUSCULAR; INTRAVENOUS AS NEEDED
Status: DISCONTINUED | OUTPATIENT
Start: 2022-11-20 | End: 2022-11-20 | Stop reason: SURG

## 2022-11-20 RX ORDER — KETAMINE HCL IN NACL, ISO-OSM 100MG/10ML
SYRINGE (ML) INJECTION AS NEEDED
Status: DISCONTINUED | OUTPATIENT
Start: 2022-11-20 | End: 2022-11-20 | Stop reason: SURG

## 2022-11-20 RX ORDER — MAGNESIUM HYDROXIDE 1200 MG/15ML
LIQUID ORAL AS NEEDED
Status: DISCONTINUED | OUTPATIENT
Start: 2022-11-20 | End: 2022-11-20 | Stop reason: HOSPADM

## 2022-11-20 RX ORDER — ONDANSETRON 2 MG/ML
4 INJECTION INTRAMUSCULAR; INTRAVENOUS ONCE AS NEEDED
Status: DISCONTINUED | OUTPATIENT
Start: 2022-11-20 | End: 2022-11-20 | Stop reason: HOSPADM

## 2022-11-20 RX ORDER — MEPERIDINE HYDROCHLORIDE 25 MG/ML
12.5 INJECTION INTRAMUSCULAR; INTRAVENOUS; SUBCUTANEOUS
Status: DISCONTINUED | OUTPATIENT
Start: 2022-11-20 | End: 2022-11-20 | Stop reason: HOSPADM

## 2022-11-20 RX ORDER — DEXMEDETOMIDINE HYDROCHLORIDE 100 UG/ML
INJECTION, SOLUTION INTRAVENOUS AS NEEDED
Status: DISCONTINUED | OUTPATIENT
Start: 2022-11-20 | End: 2022-11-20 | Stop reason: SURG

## 2022-11-20 RX ORDER — PROPOFOL 10 MG/ML
VIAL (ML) INTRAVENOUS AS NEEDED
Status: DISCONTINUED | OUTPATIENT
Start: 2022-11-20 | End: 2022-11-20 | Stop reason: SURG

## 2022-11-20 RX ORDER — MIDAZOLAM HYDROCHLORIDE 1 MG/ML
2 INJECTION INTRAMUSCULAR; INTRAVENOUS ONCE
Status: COMPLETED | OUTPATIENT
Start: 2022-11-20 | End: 2022-11-20

## 2022-11-20 RX ORDER — BUPIVACAINE HYDROCHLORIDE 2.5 MG/ML
INJECTION, SOLUTION EPIDURAL; INFILTRATION; INTRACAUDAL AS NEEDED
Status: DISCONTINUED | OUTPATIENT
Start: 2022-11-20 | End: 2022-11-20 | Stop reason: HOSPADM

## 2022-11-20 RX ORDER — DEXAMETHASONE SODIUM PHOSPHATE 4 MG/ML
INJECTION, SOLUTION INTRA-ARTICULAR; INTRALESIONAL; INTRAMUSCULAR; INTRAVENOUS; SOFT TISSUE AS NEEDED
Status: DISCONTINUED | OUTPATIENT
Start: 2022-11-20 | End: 2022-11-20 | Stop reason: SURG

## 2022-11-20 RX ORDER — SODIUM CHLORIDE, SODIUM LACTATE, POTASSIUM CHLORIDE, CALCIUM CHLORIDE 600; 310; 30; 20 MG/100ML; MG/100ML; MG/100ML; MG/100ML
9 INJECTION, SOLUTION INTRAVENOUS CONTINUOUS PRN
Status: DISCONTINUED | OUTPATIENT
Start: 2022-11-20 | End: 2022-11-20 | Stop reason: HOSPADM

## 2022-11-20 RX ADMIN — LIDOCAINE HYDROCHLORIDE 60 MG: 20 INJECTION, SOLUTION EPIDURAL; INFILTRATION; INTRACAUDAL; PERINEURAL at 05:21

## 2022-11-20 RX ADMIN — ROCURONIUM BROMIDE 10 MG: 50 INJECTION INTRAVENOUS at 06:04

## 2022-11-20 RX ADMIN — MIDAZOLAM HYDROCHLORIDE 2 MG: 1 INJECTION, SOLUTION INTRAMUSCULAR; INTRAVENOUS at 05:00

## 2022-11-20 RX ADMIN — PROPOFOL 150 MG: 10 INJECTION, EMULSION INTRAVENOUS at 05:21

## 2022-11-20 RX ADMIN — HYDROMORPHONE HYDROCHLORIDE 0.2 MG: 1 INJECTION, SOLUTION INTRAMUSCULAR; INTRAVENOUS; SUBCUTANEOUS at 06:49

## 2022-11-20 RX ADMIN — PROPOFOL 50 MCG/KG/MIN: 10 INJECTION, EMULSION INTRAVENOUS at 05:40

## 2022-11-20 RX ADMIN — SUGAMMADEX 200 MG: 100 INJECTION, SOLUTION INTRAVENOUS at 06:38

## 2022-11-20 RX ADMIN — DEXMEDETOMIDINE HYDROCHLORIDE 10 MCG: 100 INJECTION, SOLUTION, CONCENTRATE INTRAVENOUS at 05:17

## 2022-11-20 RX ADMIN — Medication 100 MCG: at 06:20

## 2022-11-20 RX ADMIN — LABETALOL 20 MG/4 ML (5 MG/ML) INTRAVENOUS SYRINGE 5 MG: at 06:28

## 2022-11-20 RX ADMIN — ESMOLOL HYDROCHLORIDE 10 MG: 10 INJECTION INTRAVENOUS at 05:53

## 2022-11-20 RX ADMIN — HYDROMORPHONE HYDROCHLORIDE 0.3 MG: 1 INJECTION, SOLUTION INTRAMUSCULAR; INTRAVENOUS; SUBCUTANEOUS at 05:50

## 2022-11-20 RX ADMIN — LABETALOL 20 MG/4 ML (5 MG/ML) INTRAVENOUS SYRINGE 5 MG: at 06:55

## 2022-11-20 RX ADMIN — LABETALOL 20 MG/4 ML (5 MG/ML) INTRAVENOUS SYRINGE 5 MG: at 06:08

## 2022-11-20 RX ADMIN — HYDROMORPHONE HYDROCHLORIDE 0.3 MG: 1 INJECTION, SOLUTION INTRAMUSCULAR; INTRAVENOUS; SUBCUTANEOUS at 06:28

## 2022-11-20 RX ADMIN — Medication 100 MCG: at 05:46

## 2022-11-20 RX ADMIN — ROCURONIUM BROMIDE 30 MG: 50 INJECTION INTRAVENOUS at 05:21

## 2022-11-20 RX ADMIN — ESMOLOL HYDROCHLORIDE 10 MG: 10 INJECTION INTRAVENOUS at 06:01

## 2022-11-20 RX ADMIN — ONDANSETRON 4 MG: 2 INJECTION INTRAMUSCULAR; INTRAVENOUS at 06:35

## 2022-11-20 RX ADMIN — Medication 100 MCG: at 05:31

## 2022-11-20 RX ADMIN — KETOROLAC TROMETHAMINE 30 MG: 30 INJECTION, SOLUTION INTRAMUSCULAR; INTRAVENOUS at 06:37

## 2022-11-20 RX ADMIN — DEXMEDETOMIDINE HYDROCHLORIDE 20 MCG: 100 INJECTION, SOLUTION, CONCENTRATE INTRAVENOUS at 05:25

## 2022-11-20 RX ADMIN — SODIUM CHLORIDE, POTASSIUM CHLORIDE, SODIUM LACTATE AND CALCIUM CHLORIDE: 600; 310; 30; 20 INJECTION, SOLUTION INTRAVENOUS at 06:38

## 2022-11-20 RX ADMIN — TAZOBACTAM SODIUM AND PIPERACILLIN SODIUM 3.38 G: 375; 3 INJECTION, SOLUTION INTRAVENOUS at 04:07

## 2022-11-20 RX ADMIN — ROCURONIUM BROMIDE 10 MG: 50 INJECTION INTRAVENOUS at 05:31

## 2022-11-20 RX ADMIN — Medication 100 MCG: at 06:02

## 2022-11-20 RX ADMIN — Medication 30 MG: at 05:25

## 2022-11-20 RX ADMIN — ESMOLOL HYDROCHLORIDE 10 MG: 10 INJECTION INTRAVENOUS at 05:56

## 2022-11-20 RX ADMIN — DEXAMETHASONE SODIUM PHOSPHATE 4 MG: 4 INJECTION, SOLUTION INTRA-ARTICULAR; INTRALESIONAL; INTRAMUSCULAR; INTRAVENOUS; SOFT TISSUE at 05:26

## 2022-11-20 RX ADMIN — HYDROMORPHONE HYDROCHLORIDE 0.2 MG: 1 INJECTION, SOLUTION INTRAMUSCULAR; INTRAVENOUS; SUBCUTANEOUS at 06:55

## 2022-11-20 RX ADMIN — LORAZEPAM 1 MG: 2 INJECTION INTRAMUSCULAR; INTRAVENOUS at 00:35

## 2022-11-20 RX ADMIN — Medication 20 MG: at 05:50

## 2022-11-20 RX ADMIN — FAMOTIDINE 20 MG: 10 INJECTION INTRAVENOUS at 00:36

## 2022-11-20 RX ADMIN — IOPAMIDOL 100 ML: 755 INJECTION, SOLUTION INTRAVENOUS at 02:40

## 2022-11-20 RX ADMIN — ACETAMINOPHEN 1000 MG: 500 TABLET ORAL at 05:00

## 2022-11-20 RX ADMIN — SODIUM CHLORIDE, POTASSIUM CHLORIDE, SODIUM LACTATE AND CALCIUM CHLORIDE 100 ML/HR: 600; 310; 30; 20 INJECTION, SOLUTION INTRAVENOUS at 08:45

## 2022-11-20 RX ADMIN — SODIUM CHLORIDE, POTASSIUM CHLORIDE, SODIUM LACTATE AND CALCIUM CHLORIDE 9 ML/HR: 600; 310; 30; 20 INJECTION, SOLUTION INTRAVENOUS at 05:00

## 2022-11-20 NOTE — ED PROVIDER NOTES
Time: 11:44 PM EST    Chief Complaint: chest pain, back pain    History of Present Illness:  Patient is a 32 y.o. year old female that presents to the emergency department with chest pain and back pain. Pt reports she started experiencing chest pain and back pain at 1600 today. She states the chest pain is substernal and above her breast, and she states the back pain is in the center of her back. She states the pain was a 10/10 in intensity prior to arrival in the ED tonight, and she reports she experiences associated SOB with her pain only when the pain is extremely severe. She notes she has never sought treatment for her symptoms before. She states she has been experiencing episodes of similar pain for a couple months now. She states the pain originally only happened intermittently, and she states her pain now lasts up to 6 hours at a time. She states her pain originally started when she was dx with anxiety a few months ago, and she states she thinks the pain is anxiety-related. She notes she also has anxiety about her pain returning again. She states she recently asked for a new anxiety medication because she wasn't noticing much difference from her current medication, and she states her doctor only increased the dosage of her current medication. She denies prior medical hx, family hx of heart disease, tobacco use, hormone use, or recent COVID dx. She denies associated nausea, leg swelling, or leg pain.        HPI    Similar Symptoms Previously: yes  Recently seen: no      Patient Care Team  Primary Care Provider: Deanne Rojas APRN    Past Medical History:     No Known Allergies  Past Medical History:   Diagnosis Date   • Anxiety    • Broken bones 4/6/2022   • Ketonuria 4/6/2022     No past surgical history on file.  No family history on file.    Home Medications:  Prior to Admission medications    Medication Sig Start Date End Date Taking? Authorizing Provider   buPROPion XL (Wellbutrin XL) 150 MG 24 hr  tablet Take 1 tablet by mouth Every Morning. 9/27/22      buPROPion XL (Wellbutrin XL) 300 MG 24 hr tablet Take 1 tablet by mouth Every Morning. 11/1/22      Liraglutide (Saxenda) 18 MG/3ML injection pen Inject 0.6 mg under the skin into the appropriate area as directed Every 7 (Seven) Days increasing weekly by 0.6 mg until 3.0 mg is achieved. 11/1/22      ondansetron (ZOFRAN) 8 MG tablet Take 1 tablet by mouth Every 8 (Eight) Hours As Needed for nausea. 8/3/22      Phentermine HCl (Lomaira) 8 MG tablet Take 8 mg by mouth 2 (Two) Times a Day 1 Hour Before Meals. 9/27/22      propranolol (INDERAL) 10 MG tablet Take 1 tablet by mouth 2 (Two) Times a Day for 30 days. 9/28/22 10/28/22  Chewning, Deanne, APRN   traZODone (DESYREL) 50 MG tablet Take 1 tablet every night as needed. 9/27/22      traZODone (DESYREL) 50 MG tablet Take 1-2 tablets by mouth At Night As Needed. 11/1/22           Social History:   Social History     Tobacco Use   • Smoking status: Never   • Smokeless tobacco: Never   Substance Use Topics   • Alcohol use: Not Currently   • Drug use: Never       Record Review:  I have reviewed the patient's records in [facility].     Review of Systems:  Review of Systems   Constitutional: Negative for chills and fever.   HENT: Negative for congestion, rhinorrhea and sore throat.    Eyes: Negative for pain and visual disturbance.   Respiratory: Positive for shortness of breath (only with extreme pain). Negative for apnea, cough and chest tightness.    Cardiovascular: Positive for chest pain (substernal and above breast). Negative for palpitations and leg swelling.   Gastrointestinal: Negative for abdominal pain, diarrhea, nausea and vomiting.   Genitourinary: Negative for difficulty urinating and dysuria.   Musculoskeletal: Positive for back pain (center of back). Negative for joint swelling and myalgias.        - leg pain   Skin: Negative for color change.   Neurological: Negative for seizures and headaches.  "  Psychiatric/Behavioral: The patient is nervous/anxious.    All other systems reviewed and are negative.       Physical Exam:  /63   Pulse 101   Temp 96.8 °F (36 °C) (Temporal)   Resp 14   Ht 154.9 cm (61\")   Wt 74.7 kg (164 lb 10.9 oz)   SpO2 95%   BMI 31.12 kg/m²     Physical Exam  Vitals and nursing note reviewed.   Constitutional:       General: She is not in acute distress.     Appearance: Normal appearance. She is not toxic-appearing.   HENT:      Head: Normocephalic and atraumatic.      Jaw: There is normal jaw occlusion.   Eyes:      General: Lids are normal.      Extraocular Movements: Extraocular movements intact.      Conjunctiva/sclera: Conjunctivae normal.      Pupils: Pupils are equal, round, and reactive to light.   Cardiovascular:      Rate and Rhythm: Regular rhythm. Tachycardia present.      Pulses: Normal pulses.      Heart sounds: Normal heart sounds.   Pulmonary:      Effort: Pulmonary effort is normal. No respiratory distress.      Breath sounds: Normal breath sounds. No wheezing or rhonchi.   Abdominal:      General: Abdomen is flat.      Palpations: Abdomen is soft.      Tenderness: There is no abdominal tenderness. There is no guarding or rebound.   Musculoskeletal:         General: Normal range of motion.      Cervical back: Normal range of motion and neck supple.      Right lower leg: No edema.      Left lower leg: No edema.   Skin:     General: Skin is warm and dry.   Neurological:      Mental Status: She is alert and oriented to person, place, and time. Mental status is at baseline.   Psychiatric:         Mood and Affect: Mood is anxious.                  Medications in the Emergency Department:  Medications   sodium chloride 0.9 % flush 10 mL ( Intravenous MAR Hold 11/20/22 0448)   aspirin chewable tablet 324 mg ( Oral MAR Hold 11/20/22 0448)   lactated ringers infusion ( Intravenous Anesthesia Volume Adjustment 11/20/22 8710)   oxyCODONE (ROXICODONE) immediate release " tablet 5 mg (has no administration in time range)   HYDROmorphone (DILAUDID) injection 0.25 mg (has no administration in time range)   HYDROmorphone (DILAUDID) injection 0.5 mg (has no administration in time range)   ondansetron (ZOFRAN) injection 4 mg (has no administration in time range)   promethazine (PHENERGAN) suppository 25 mg (has no administration in time range)     Or   promethazine (PHENERGAN) tablet 25 mg (has no administration in time range)   meperidine (DEMEROL) injection 12.5 mg (has no administration in time range)   LORazepam (ATIVAN) injection 1 mg (1 mg Intravenous Given 11/20/22 0035)   famotidine (PEPCID) injection 20 mg (20 mg Intravenous Given 11/20/22 0036)   iopamidol (ISOVUE-370) 76 % injection 100 mL (100 mL Intravenous Given 11/20/22 0240)   piperacillin-tazobactam (ZOSYN) 3.375 g in iso-osmotic dextrose 50 ml (premix) (3.375 g Intravenous New Bag 11/20/22 0407)   acetaminophen (TYLENOL) tablet 1,000 mg (1,000 mg Oral Given 11/20/22 0500)   midazolam (VERSED) injection 2 mg (2 mg Intravenous Given 11/20/22 0500)        Labs  Lab Results (last 24 hours)     Procedure Component Value Units Date/Time    POC Troponin I with Hold Tube [[id number]] Collected: 11/19/22 2326    Specimen: Blood Updated: 11/20/22 0019    Narrative:      The following orders were created for panel order POC Troponin I with Hold Tube.  Procedure                               Abnormality         Status                     ---------                               -----------         ------                     POC Troponin I[[id number]]                                                              HOLD Troponin-I Tube[[id number]]                             Final result                 Please view results for these tests on the individual orders.    CBC & Differential [[id number]]  (Abnormal) Collected: 11/19/22 2326    Specimen: Blood Updated: 11/19/22 2335    Narrative:      The following orders were created for panel  order CBC & Differential.  Procedure                               Abnormality         Status                     ---------                               -----------         ------                     CBC Auto Differential[[id number]]        Abnormal            Final result                 Please view results for these tests on the individual orders.    Comprehensive Metabolic Panel [[id number]]  (Abnormal) Collected: 11/19/22 2326    Specimen: Blood Updated: 11/20/22 0011     Glucose 120 mg/dL      BUN 10 mg/dL      Creatinine 0.74 mg/dL      Sodium 137 mmol/L      Potassium 4.5 mmol/L      Chloride 102 mmol/L      CO2 25.1 mmol/L      Calcium 9.6 mg/dL      Total Protein 9.0 g/dL      Albumin 4.40 g/dL      ALT (SGPT) 373 U/L      AST (SGOT) 776 U/L      Alkaline Phosphatase 183 U/L      Total Bilirubin 1.0 mg/dL      Globulin 4.6 gm/dL      A/G Ratio 1.0 g/dL      BUN/Creatinine Ratio 13.5     Anion Gap 9.9 mmol/L      eGFR 109.7 mL/min/1.73      Comment: National Kidney Foundation and American Society of Nephrology (ASN) Task Force recommended calculation based on the Chronic Kidney Disease Epidemiology Collaboration (CKD-EPI) equation refit without adjustment for race.       Narrative:      GFR Normal >60  Chronic Kidney Disease <60  Kidney Failure <15      Lipase [[id number]]  (Abnormal) Collected: 11/19/22 2326    Specimen: Blood Updated: 11/20/22 0002     Lipase 80 U/L     BNP [[id number]]  (Normal) Collected: 11/19/22 2326    Specimen: Blood Updated: 11/19/22 2357     proBNP 69.8 pg/mL     Narrative:      Among patients with dyspnea, NT-proBNP is highly sensitive for the detection of acute congestive heart failure. In addition NT-proBNP of <300 pg/ml effectively rules out acute congestive heart failure with 99% negative predictive value.      Magnesium [[id number]]  (Normal) Collected: 11/19/22 2326    Specimen: Blood Updated: 11/20/22 0002     Magnesium 1.9 mg/dL     CBC Auto Differential [[id number]]   (Abnormal) Collected: 11/19/22 2326    Specimen: Blood Updated: 11/19/22 2335     WBC 8.88 10*3/mm3      RBC 5.19 10*6/mm3      Hemoglobin 14.6 g/dL      Hematocrit 44.2 %      MCV 85.2 fL      MCH 28.1 pg      MCHC 33.0 g/dL      RDW 14.6 %      RDW-SD 45.1 fl      MPV 9.6 fL      Platelets 401 10*3/mm3      Neutrophil % 78.2 %      Lymphocyte % 13.7 %      Monocyte % 6.4 %      Eosinophil % 1.1 %      Basophil % 0.3 %      Immature Grans % 0.3 %      Neutrophils, Absolute 6.93 10*3/mm3      Lymphocytes, Absolute 1.22 10*3/mm3      Monocytes, Absolute 0.57 10*3/mm3      Eosinophils, Absolute 0.10 10*3/mm3      Basophils, Absolute 0.03 10*3/mm3      Immature Grans, Absolute 0.03 10*3/mm3      nRBC 0.0 /100 WBC     hCG, Serum, Qualitative [[id number]]  (Normal) Collected: 11/19/22 2326    Specimen: Blood Updated: 11/20/22 0153     HCG Qualitative Negative    Narrative:      Sensitive immunoassays may demonstrate false positive results  with specimens containing heterophilic antibodies. Assays may  also exhibit false-positive or false-negative results with  specimens containing human anti-mouse antibodies. These   specimens may come from patients receiving preparations of  mouse monoclonal antibodies for diagnosis or therapy or having  been exposed to mice. If the qualitative interpretation is  inconsistent with the clinical evaluation, results should be   confirmed by an alternate hCG method, ie. quantitative hCG.    POC Troponin I [[id number]]  (Normal) Collected: 11/19/22 2336    Specimen: Blood Updated: 11/19/22 2348     Troponin I 0.00 ng/mL      Comment: Serial Number: [id number]:  [id number]              Imaging:  CT Abdomen Pelvis With Contrast    Result Date: 11/20/2022  PROCEDURE: CT ABDOMEN PELVIS W CONTRAST  COMPARISON: Abdominal ultrasound examination, 11/20/2022, 0056 hours.  INDICATIONS: MID ABDOMEN PAIN TODAY.  TECHNIQUE: After obtaining the patient's consent, 532 CT images were created with non-ionic  intravenous contrast material.  No oral contrast agent was administered for the study.  PROTOCOL:   Standard CT imaging protocol performed.    RADIATION:   Total DLP: 460.8 mGy*cm   Automated exposure control was utilized to minimize radiation dose. CONTRAST: 100 mL Isovue 370 I.V.  FINDINGS: Gallstones are seen.  There is pericholecystic fluid and inflammatory change.  Acute cholecystitis cannot be excluded.  No choledocholithiasis is seen.  The maximum diameter of the distal common bile duct is about 5 mm.  No acute pancreatitis.  There is diffuse hepatic steatosis without hepatomegaly.  No splenomegaly.  No acute appendicitis is seen.  The appendix is in a subhepatic retrocolonic region, as on image 69 of series 202 and image 48 of series 201.  There may be scattered colonic diverticula without acute diverticulitis.  No mechanical bowel obstruction.  No pneumoperitoneum or pneumatosis.  No definite nephrolithiasis or ureterolithiasis.  No hydronephrosis.  No acute pyelonephritis.  No adrenal mass.  There is mild diastasis of the rectus sheath with a tiny fat containing umbilical hernia.  It does not contain bowel.  No suspicious uterine or adnexal mass.  There is a suspected benign functional cyst on the right measuring about 2.5 cm in greatest axial diameter seen on image 87 of series 201.  No urinary bladder calculi are seen.  A minimal amount of free fluid is seen in the cul-de-sac, slightly greater on the left, such as seen on image 89 of series 202 with a CT number of about Hounsfield units.  No acute intraperitoneal or retroperitoneal hemorrhage.  No aneurysmal dilatation of the aortoiliac arterial system.  There is chronic calcified granulomatous disease of the chest.  There may be minimal subsegmental atelectasis in the lung bases.  No acute infiltrate is suggested.  No acute fracture.  No aggressive osseous lesion.  There may be minimal sclerotic changes of the bilateral sacroiliac joints.         1. CT  findings are most suggestive of acute cholecystitis.  Gallstones are seen.  No biliary ductal dilatation is appreciated.  No acute pancreatitis.   2. There is an incidental 2.5 cm right adnexal cyst.  It probably represents a normal functional ovarian cysts.  A small amount of nonspecific free fluid is seen in the cul-de-sac.   3. No acute appendicitis.   4. No other acute findings are suspected.   5. Please see above comments for further detail.     Please note that portions of this note were completed with a voice recognition program.  TITUS JERONIMO JR, MD       Electronically Signed and Approved By: ITTUS JERONIMO JR, MD on 11/20/2022 at 3:02              FL < 1 Hour    Result Date: 11/20/2022  This procedure was auto-finalized with no dictation required.    US Gallbladder    Result Date: 11/20/2022  PROCEDURE: US GALLBLADDER  COMPARISON: None.  INDICATIONS: 32-year-old female with right upper quadrant abdominal pain.  TECHNIQUE: A limited abdominal ultrasound examination of the right upper quadrant was performed, tailored in order to evaluate the gallbladder and the biliary tree.   FINDINGS:  Two-dimensional grayscale images as well as color and spectral Doppler analysis are provided for review. The patient was fasting as per protocol for the study. No gallstones or acute cholecystitis are seen.  There is suspected dependent nonshadowing gallbladder sludge.  No gallbladder wall thickening. No pericholecystic fluid. No focal abnormalities are seen involving the liver or right kidney. The pancreas is obscured by bowel gas. Its visualized portions are unremarkable. Doppler interrogation of the hepatic vasculature reveals patent vessels with normal blood flow direction. The common bile duct measures 4.2 mm in diameter. No biliary ductal dilatation is seen. No choledocholithiasis. The yqhy-lm-oimk length of the right kidney is 11.4 cm. No right hydronephrosis. The craniocaudal dimension of the right lobe of the  liver is 13.1 cm. The left kidney, spleen, inferior vena cava, and abdominal aorta were not evaluated. A negative sonographic Gamino's sign was reported.        No acute cholecystitis. No gallstones.  There is suspected gallbladder sludge.  No biliary ductal dilatation. The other findings are as detailed above.    Please note that portions of this note were completed with a voice recognition program.  TITUS JERONIMO JR, MD       Electronically Signed and Approved By: TITUS EJRONIMO JR, MD on 11/20/2022 at 1:27              XR Chest 1 View    Result Date: 11/19/2022  PROCEDURE: XR CHEST 1 VW  COMPARISON: 7/7/2009.  INDICATIONS: 32-YEAR-OLD FEMALE W/ H/O CHEST PAIN.  FINDINGS: A single frontal (AP or PA upright portable) chest radiograph reveals no cardiac enlargement and no acute infiltrate. No pneumothorax is seen.  Chronic calcified granulomatous disease involves the chest.        No acute cardiopulmonary disease is seen radiographically.     Please note that portions of this note were completed with a voice recognition program.  TITUS JERONIMO JR, MD       Electronically Signed and Approved By: TITUS JERONIMO JR, MD on 11/19/2022 at 23:45              XR Abdomen KUB    Result Date: 11/20/2022  PROCEDURE: XR ABDOMEN KUB  COMPARISON: [facility], CT, CT ABDOMEN PELVIS W CONTRAST, 11/20/2022, 2:37.  INDICATIONS: IOC/ FLURO TIME 58 SEC/ 20.32 mGy  FINDINGS:   Intraoperative cholangiography demonstrates 2 persistent filling defects in distal common bile duct measuring 3-4 mm consistent with choledocholithiasis.  BRITTON MEDELLIN MD       Electronically Signed and Approved By: BRITTON MEDELLIN MD on 11/20/2022 at 6:39               Procedures:  ECG 12 Lead      Date/Time: 11/19/2022 11:57 PM  Performed by: Russ Pierce MD  Authorized by: Russ Pierce MD   Interpreted by physician  Comments: Sinus rhythm, 97 bpm, jacquie P wave, normal QRS, normal QT, no ECG available for comparison          Progress                             Medical Decision Making:  MDM  Number of Diagnoses or Management Options  Acute cholecystitis  Diagnosis management comments: Due to patient's abnormal LFTs ultrasound and CT were ordered.  Ultrasound initially only showed gallbladder sludge so CT was performed neurology for the patient's symptoms.  I revealed findings of acute cholecystitis.  CT findings and presentation along with abnormal laboratory examinations were discussed with Dr. Chicas of general surgery who agrees to take the patient to the operating room for acute cholecystectomy.  The patient's airway is intact, vital signs, and respiratory status are safe for admission at this time.       Amount and/or Complexity of Data Reviewed  Clinical lab tests: ordered and reviewed  Tests in the radiology section of CPT®: ordered and reviewed  Tests in the medicine section of CPT®: ordered and reviewed  Decide to obtain previous medical records or to obtain history from someone other than the patient: yes         Final diagnoses:   Acute cholecystitis        Disposition:  ED Disposition     ED Disposition   Decision to Admit    Condition   --    Comment   --             Dictated Utilizing Dragon Dictation    Documentation assistance provided by Hal Olea acting as scribe for Russ Pierce MD. Information recorded by the scribe was done at my direction and has been verified and validated by me.          Hal Olea  11/20/22 0015       Russ Pierce MD  11/20/22 4182

## 2022-11-20 NOTE — DISCHARGE INSTRUCTIONS
Dr. Chicas's Instructions          DIET  Gradually increase your dietary intake.  Although you will likely feel very hungry after surgery, do not eat too much for the first 12 to 24 hours.  Begin with a bland diet, such as chicken noodle soup, crackers, gatorade or tea, and gradually work your way up to a normal diet.     ACTIVITY & RETURN TO WORK  When you first get home from the hospital, it is important that you get up and move around your house.  For the next four weeks, you should avoid any strenuous physical activity and you should not lift anything heavier than 25 pounds.  Walking up stairs and walking short distances for exercise are acceptable activities.  After four weeks, you have no activity restrictions and may gradually increase your activities using common sense.  You are excused from work for four weeks but you may return to work before then should you feel able to do so AND you abide by the lifting restriction.     WOUND CARE & SHOWERING/BATHING  Your incisions are covered with a plastic type material that will flake off on its own in the next few weeks.  If the plastic type material has not flaked off on its own by 2 weeks after your surgery then use tweezers to pull it off.  You have sutures in your incisions but they are placed below the level of the skin and they will slowly dissolve (they do not need to be removed).  The skin around your incisions will likely have some bruising.  This is normal.  You can shower beginning tomorrow but wait two weeks after your surgery before taking any tub baths.    PAIN CONTROL  A narcotic pain medicine prescription and an anti-nausea medicine prescription were sent to [facility] in [facility].  Be sure to take the narcotic pain medication with some food so as not to upset your stomach.  Do not drive while you are taking the prescription pain medication.  Some patients find that using an ice pack (a package of frozen corn or peas works well) for the first two  days after surgery helps reduce pain.  If you decide to use an ice pack, apply it for 20 minutes and then remove it for 20 minutes.  Do this 4 or 5 times each day for only the first two or three days after surgery.  You will likely have some shoulder pain (especially the right shoulder).  This is caused by the air used to inflate your abdomen during surgery and will go away on its own in 24 to 48 hours.     BOWEL MOVEMENTS  It is not unusual for narcotic pain medications to cause constipation.  Also, the medications and anesthesia you received for your surgery can have a constipating effect.  To help avoid constipation, drink at least four eight-ounce glasses of water each day and use over-the-counter laxatives/stool softeners (dulcolax, milk of magnesia, senokot, etc.).  I recommend drinking the aforementioned amount of water daily and taking 30 ml of milk of magnesia two times each day while you are using the prescribed narcotic pain medication.  If your bowel movements become too loose or too frequent, then simply stop following these recommendations unless you start to feel constipated.     FOLLOW-UP VISIT & QUESTIONS/CONCERNS  Call Dr. Chicas's office at [phone] and schedule a follow-up appointment for about 3 to 4 weeks after your surgery date.  Should you have any questions or concerns, have a temperature over 101 degrees, worsening abdominal pain, persistent nausea or vomiting, or any other problems you think need medical attention, please call Dr. Chicas's office or go to the emergency room.  Also, arrangements have been made for you to have an ERCP with removal of the common bile duct stones by Dr. Garcia on Tuesday, November 22.  You should arrive at the Endo Suite at [facility] at 7 AM on Tuesday, November 22.  Do not eat or drink anything after midnight.

## 2022-11-20 NOTE — ANESTHESIA POSTPROCEDURE EVALUATION
Patient: Loretta Layton    Procedure Summary     Date: 11/20/22 Room / Location: [facility] OR 06 / [facility] MAIN OR    Anesthesia Start: 0514 Anesthesia Stop: 0657    Procedure: CHOLECYSTECTOMY LAPAROSCOPIC INTRAOPERATIVE CHOLANGIOGRAM (Abdomen) Diagnosis:       Acute cholecystitis      (Acute cholecystitis [K81.0])    Surgeons: Luiz Chicas MD Provider: Abdiel Orosco MD    Anesthesia Type: general ASA Status: 2          Anesthesia Type: general    Vitals  Vitals Value Taken Time   /63 11/20/22 0715   Temp 36 °C (96.8 °F) 11/20/22 0655   Pulse 101 11/20/22 0715   Resp 16 11/20/22 0710   SpO2 95 % 11/20/22 0715           Post Anesthesia Care and Evaluation    Patient location during evaluation: bedside  Patient participation: complete - patient participated  Level of consciousness: awake  Pain management: adequate    Airway patency: patent  Anesthetic complications: No anesthetic complications  PONV Status: none  Cardiovascular status: acceptable and stable  Respiratory status: acceptable  Hydration status: acceptable    Comments: An Anesthesiologist personally participated in the most demanding procedures (including induction and emergence if applicable) in the anesthesia plan, monitored the course of anesthesia administration at frequent intervals and remained physically present and available for immediate diagnosis and treatment of emergencies.

## 2022-11-20 NOTE — OP NOTE
Operative Report    Patient Name:  Loretta Layton  YOB: 1990    Date of Surgery:  11/20/2022     Pre-op Diagnosis:   Acute cholecystitis       Post-op Diagnosis:   Acute cholecystitis  Common bile duct stone    Procedure(s):  CHOLECYSTECTOMY LAPAROSCOPIC INTRAOPERATIVE CHOLANGIOGRAM    Staff:  Surgeon(s):  Luiz Chicas MD    Assistant: Dawn Strickland RN    Anesthesia: General    Estimated Blood Loss: 5 mL    Complications:  None    Drains:  None    Packing:  None    Implants:    Implant Name Type Inv. Item Serial No.  Lot No. LRB No. Used Action   CLIPAPPLR M/ ENDO LIGACLIP ROT 10MM MD/LG - [id number] Implant CLIPAPPLR M/ ENDO LIGACLIP ROT 10MM MD/LG  [employer]  DIV OF J AND J [id number] N/A 1 Implanted   RELOAD STPLR ENDOPATH/ETS LNR/CUT THN 45MM WHT - [id number] Implant RELOAD STPLR ENDOPATH/ETS LNR/CUT THN 45MM WHT  [employer]  DIV OF J AND J [id number] N/A 1 Implanted     Specimen:  Gallbladder    Indications: 32-year-old female who is in the room today with signs and symptoms consistent with acute cholecystitis.  CT scan and ultrasound did not show a dilated biliary tree and bilirubin was normal.  AST and ALT were elevated and lipase was mildly elevated at 80.     Findings: Gallbladder acutely inflamed.  Liver normal appearing.  Two small filling defects identified in the distal common bile duct with cholangiogram.  However, contrast easily flowed into the duodenum.  Will contact GI and make arrangements for patient to have ERCP.    Description of Procedure:  Patient was taken to the operating placed supine on the operating table.  Timeout was performed.  General anesthesia was administered.  Abdomen was prepped and draped in the usual fashion.  Using my standard technique with a Veress needle to establish pneumoperitoneum and my standard four cannula sizes and locations on the abdominal wall, pneumoperitoneum was established and four cannulas were placed.  A  laparoscope was inserted.  There was no evidence of injury to any intra-abdominal structures from using the Veress needle from placing the cannulas.  Patient was positioned head up and rotated toward the left side.  Attention was focused in the right upper quadrant.  Liver was normal appearing.  Gallbladder was acutely inflamed appearing and there were adhesions of omentum to the gallbladder.  The adhered mentum was released using hook cautery.  A grasper was placed on the fundus of the gallbladder and used to elevate the gallbladder superiorly.  Another grasper was placed on the infundibulum of the gallbladder and used to apply lateral and inferior retraction.  Tissue in the triangle of Calot was dissected until I achieved a critical view of safety.  A Rothman clamp and [employer] cholangiogram catheter were used to perform a cholangiogram.  The cholangiogram showed normal flow of contrast into the duodenum there were 2 small filling defects visible in the distal common bile duct.  The cholangiogram confirmed my critical view of safety.  The [employer] cholangiogram equipment was removed.  Given the presence of the common bile duct stones, I decided to use a white load of the endoscopic linear cutting stapler staple across and divide the cystic duct.  The cystic artery was divided using laparoscopic LigaSure.  Hook electrocautery was used to cauterize the cholecystohepatic attachments until the gallbladder was freed from the liver.  The gallbladder was placed in an Endo Catch bag and removed from the abdomen and sent to pathology.  The gallbladder fossa was examined.  A small amount of bleeding from the staple line and the bleeding stopped after one clip was placed..  There was no leakage of bile.  The patient was positioned flat.  Pneumoperitoneum was released and all of the laparoscopic equipment was removed.  The skin incisions were closed with buried absorbable suture followed by appropriate dressings.  No complications.   Patient did well during the procedure and was transported to the recovery area in stable condition.  Sponge, needle, and instrument counts were correct.      Luiz Chicas MD     Date: 11/20/2022  Time: 07:59 EST    Electronically signed by Luiz Chicas MD, 11/20/22, 7:59 AM EST.

## 2022-11-20 NOTE — H&P
[facility]   HISTORY AND PHYSICAL      Patient Name: Loretta Layton  : 1990  MRN: [id number]  Primary Care Physician:  Deanne Rojas APRN  Date of admission: 2022    Subjective     Chief Complaint:  Epigastric and RUQ Abd Pain    HPI:  Loretta Layton is a 32 y.o. female who came to the emergency room after onset of epigastric and right upper quadrant abdominal pain earlier today.  The pain persisted and became more severe.  Labs were done and white count is normal.  AST and ALT are elevated.  Bilirubin is normal.  Lab results are available below.  Lipase is also mildly elevated at about 80.  Ultrasound was done and showed gallstones but no cholecystitis and no biliary ductal dilatation.  CT scan was done and showed cholecystitis.  Patient has had upper abdominal pain and right upper quadrant abdominal pain on and off for the past several months but she did not know she had gallstones.  No medical problems besides anxiety.  Patient has not had any abdominal surgeries.  No fevers.  No diarrhea.    Personal History   Allergies:  No Known Allergies    Home Medications:  Prior to Admission medications    Medication Sig Start Date End Date Taking? Authorizing Provider   buPROPion XL (Wellbutrin XL) 150 MG 24 hr tablet Take 1 tablet by mouth Every Morning. 22      buPROPion XL (Wellbutrin XL) 300 MG 24 hr tablet Take 1 tablet by mouth Every Morning. 22      Liraglutide (Saxenda) 18 MG/3ML injection pen Inject 0.6 mg under the skin into the appropriate area as directed Every 7 (Seven) Days increasing weekly by 0.6 mg until 3.0 mg is achieved. 22      ondansetron (ZOFRAN) 8 MG tablet Take 1 tablet by mouth Every 8 (Eight) Hours As Needed for nausea. 8/3/22      Phentermine HCl (Lomaira) 8 MG tablet Take 8 mg by mouth 2 (Two) Times a Day 1 Hour Before Meals. 22      propranolol (INDERAL) 10 MG tablet Take 1 tablet by mouth 2 (Two) Times a Day for 30 days. 9/28/22 10/28/22   Deanne Rojas APRN   traZODone (DESYREL) 50 MG tablet Take 1 tablet every night as needed. 9/27/22      traZODone (DESYREL) 50 MG tablet Take 1-2 tablets by mouth At Night As Needed. 11/1/22          Past Medical History:   Diagnosis Date   • Anxiety    • Broken bones 4/6/2022   • Ketonuria 4/6/2022       No past surgical history on file.    Social History:  reports that she has never smoked. She has never used smokeless tobacco. She reports that she does not currently use alcohol. She reports that she does not use drugs.   and has children.  Lives in [facility].  Works as a nurse at [employer].    Family History: family history is not on file. Otherwise pertinent FHx was reviewed and not pertinent to current issue.    Review of Systems: Review of systems is noncontributory besides as mentioned in above HPI section.       Objective   Vitals:   Temp:  [97.6 °F (36.4 °C)] 97.6 °F (36.4 °C)  Heart Rate:  [] 117  Resp:  [19] 19  BP: (105-137)/(77-92) 105/77  Physical Exam   • Constitutional:  present in room, alert, no acute distress, reliable historian  • HENT:  NCAT, no visible deformities or lesions  • Eyes:  sclerae clear, conjunctivae clear, EOMI  • Neck:  normal appearance, no masses, trachea midline  • Respiratory:  breathing not labored, respiratory effort appears normal  • Cardiovascular:  heart regular rate and rhythm  • Abdomen:  soft, nondistended, tender to palpation at epigastric area and RUQ area  • Skin and subcutaneous tissue:  no visible concerning rashes or lesions, no jaundice  • Musculoskeletal: moving all extremities symmetrically and purposefully  • Neurologic:  no obvious motor or sensory deficits, cerebellar function without any obvious abnormalities, alert & oriented x 3, speech clear  • Psychiatric:  judgment and insight intact, cooperative        Results from last 7 days   Lab Units 11/19/22  2326   WBC 10*3/mm3 8.88   HEMOGLOBIN g/dL 14.6   HEMATOCRIT % 44.2    PLATELETS 10*3/mm3 401     Results from last 7 days   Lab Units 11/19/22  2326   SODIUM mmol/L 137   POTASSIUM mmol/L 4.5   CHLORIDE mmol/L 102   CO2 mmol/L 25.1   BUN mg/dL 10   CREATININE mg/dL 0.74   CALCIUM mg/dL 9.6   BILIRUBIN mg/dL 1.0   ALK PHOS U/L 183*   ALT (SGPT) U/L 373*   AST (SGOT) U/L 776*   GLUCOSE mg/dL 120*         Lab Results   Lab Value Date/Time    LIPASE 80 (H) 11/19/2022 2326     No results found for: INR  No results found for: LACTATE    Radiology:  CT Abdomen Pelvis With Contrast   Final Result           1. CT findings are most suggestive of acute cholecystitis.  Gallstones are seen.  No biliary ductal    dilatation is appreciated.  No acute pancreatitis.         2. There is an incidental 2.5 cm right adnexal cyst.  It probably represents a normal functional    ovarian cysts.  A small amount of nonspecific free fluid is seen in the cul-de-sac.         3. No acute appendicitis.         4. No other acute findings are suspected.         5. Please see above comments for further detail.                 Please note that portions of this note were completed with a voice recognition program.       TITUS JERONIMO JR, MD          Electronically Signed and Approved By: TITUS JERONIMO JR, MD on 11/20/2022 at 3:02                               US Gallbladder   Final Result       No acute cholecystitis. No gallstones.  There is suspected gallbladder sludge.  No biliary ductal    dilatation. The other findings are as detailed above.               Please note that portions of this note were completed with a voice recognition program.       TITUS JERONIMO JR, MD          Electronically Signed and Approved By: TITUS JERONIMO JR, MD on 11/20/2022 at 1:27                               XR Chest 1 View   Final Result    No acute cardiopulmonary disease is seen radiographically.                   Please note that portions of this note were completed with a voice recognition program.       TITUS JERONIMO JR, MD           Electronically Signed and Approved By: TITUS JERONIMO JR, MD on 11/19/2022 at 23:45                                      LABS:  Lab Results (last 48 hours)     Procedure Component Value Units Date/Time    hCG, Serum, Qualitative [[id number]]  (Normal) Collected: 11/19/22 2326    Specimen: Blood Updated: 11/20/22 0153     HCG Qualitative Negative         Assessment & Plan   Assessment / Plan     Assessment:  Active Hospital Problems:  Active Hospital Problems    Diagnosis    • Acute cholecystitis        Plan:   Laparoscopic cholecystectomy with intraoperative cholangiogram    Discussion:  Indications, options, risks, benefits, and expected outcomes of planned surgery were discussed with the patient and she agrees to proceed.      Luiz Chicas MD  11/19/2022    Electronically signed by Luiz Chicas MD, 11/20/22, 4:39 AM EST.

## 2022-11-20 NOTE — ANESTHESIA PREPROCEDURE EVALUATION
Anesthesia Evaluation     Patient summary reviewed and Nursing notes reviewed   no history of anesthetic complications:  NPO Solid Status: > 8 hours  NPO Liquid Status: > 2 hours           Airway   Mallampati: II  TM distance: >3 FB  Neck ROM: full  No difficulty expected  Dental      Pulmonary - negative pulmonary ROS and normal exam    breath sounds clear to auscultation  Cardiovascular - negative cardio ROS and normal exam  Exercise tolerance: good (4-7 METS)    Rhythm: regular  Rate: normal        Neuro/Psych- negative ROS  GI/Hepatic/Renal/Endo - negative ROS     Musculoskeletal (-) negative ROS    Abdominal    Substance History - negative use     OB/GYN negative ob/gyn ROS         Other - negative ROS       ROS/Med Hx Other:                    Anesthesia Plan    ASA 2     general       Anesthetic plan, risks, benefits, and alternatives have been provided, discussed and informed consent has been obtained with: patient.        CODE STATUS:

## 2022-11-21 ENCOUNTER — TELEPHONE (OUTPATIENT)
Dept: SURGERY | Facility: CLINIC | Age: 32
End: 2022-11-21

## 2022-11-21 ENCOUNTER — TELEPHONE (OUTPATIENT)
Dept: GASTROENTEROLOGY | Facility: CLINIC | Age: 32
End: 2022-11-21

## 2022-11-21 ENCOUNTER — PREP FOR SURGERY (OUTPATIENT)
Dept: OTHER | Facility: HOSPITAL | Age: 32
End: 2022-11-21

## 2022-11-21 DIAGNOSIS — K80.50 COMMON BILE DUCT STONE: Primary | ICD-10-CM

## 2022-11-21 NOTE — TELEPHONE ENCOUNTER
Provider: DR HARMON    Caller: ALIS VELASQUEZ    Relationship to Patient: SELF  Phone Number: [phone]    Reason for Call: PT CALLED TO GET A NOTE FOR HER WORK STATING SHE WILL BE OUT FOR 4 WEEKS AND THAT SHE CANT LIFT MORE THAN 25 LBS.     PLEASE UPLOAD TO MY CHARTS.     CALL PT WITH ANY QUESTIONS    When was the patient last seen: 11-20-22 FOR A CHOLECYSTECTOMY LAPAROSCOPIC INTRAOPERATIVE CHOLANGIOGRAM     PT HAS PO APPT ON 12-12-22.

## 2022-11-21 NOTE — TELEPHONE ENCOUNTER
I rec'd call from Dr. Garcai about this patient. He has s/w Dr. Chicas. Pt needs ERCP TOMORROW for bile duct stones. I have created CR and s/w patient. She is aware of ERCP and NPO after midnight.

## 2022-11-22 ENCOUNTER — HOSPITAL ENCOUNTER (OUTPATIENT)
Facility: HOSPITAL | Age: 32
Setting detail: HOSPITAL OUTPATIENT SURGERY
Discharge: HOME | End: 2022-11-22
Attending: INTERNAL MEDICINE | Admitting: INTERNAL MEDICINE

## 2022-11-22 ENCOUNTER — ANESTHESIA EVENT (OUTPATIENT)
Dept: GASTROENTEROLOGY | Facility: HOSPITAL | Age: 32
End: 2022-11-22

## 2022-11-22 ENCOUNTER — ANESTHESIA (OUTPATIENT)
Dept: GASTROENTEROLOGY | Facility: HOSPITAL | Age: 32
End: 2022-11-22

## 2022-11-22 ENCOUNTER — APPOINTMENT (OUTPATIENT)
Dept: GENERAL RADIOLOGY | Facility: HOSPITAL | Age: 32
End: 2022-11-22

## 2022-11-22 VITALS
SYSTOLIC BLOOD PRESSURE: 131 MMHG | RESPIRATION RATE: 20 BRPM | BODY MASS INDEX: 31.41 KG/M2 | DIASTOLIC BLOOD PRESSURE: 97 MMHG | WEIGHT: 166.23 LBS | OXYGEN SATURATION: 100 % | HEART RATE: 65 BPM | TEMPERATURE: 97.1 F

## 2022-11-22 DIAGNOSIS — K80.50 COMMON BILE DUCT STONE: ICD-10-CM

## 2022-11-22 LAB
CYTO UR: NORMAL
LAB AP CASE REPORT: NORMAL
LAB AP CLINICAL INFORMATION: NORMAL
PATH REPORT.FINAL DX SPEC: NORMAL
PATH REPORT.GROSS SPEC: NORMAL

## 2022-11-22 PROCEDURE — C1769 GUIDE WIRE: HCPCS | Performed by: INTERNAL MEDICINE

## 2022-11-22 PROCEDURE — 25010000002 FENTANYL CITRATE (PF) 50 MCG/ML SOLUTION: Performed by: NURSE ANESTHETIST, CERTIFIED REGISTERED

## 2022-11-22 PROCEDURE — C2617 STENT, NON-COR, TEM W/O DEL: HCPCS | Performed by: INTERNAL MEDICINE

## 2022-11-22 PROCEDURE — 25010000002 PROPOFOL 10 MG/ML EMULSION: Performed by: NURSE ANESTHETIST, CERTIFIED REGISTERED

## 2022-11-22 PROCEDURE — 43264 ERCP REMOVE DUCT CALCULI: CPT | Performed by: INTERNAL MEDICINE

## 2022-11-22 PROCEDURE — 43274 ERCP DUCT STENT PLACEMENT: CPT | Performed by: INTERNAL MEDICINE

## 2022-11-22 PROCEDURE — 25010000002 IOPAMIDOL 61 % SOLUTION: Performed by: INTERNAL MEDICINE

## 2022-11-22 PROCEDURE — 74330 X-RAY BILE/PANC ENDOSCOPY: CPT

## 2022-11-22 PROCEDURE — C1876 STENT, NON-COA/NON-COV W/DEL: HCPCS | Performed by: INTERNAL MEDICINE

## 2022-11-22 DEVICE — STNT PANC GEENEN ST W/GW 5F 5CM: Type: IMPLANTABLE DEVICE | Site: PANCREAS | Status: FUNCTIONAL

## 2022-11-22 DEVICE — BILIARY STENT
Type: IMPLANTABLE DEVICE | Site: BILE DUCT | Status: FUNCTIONAL
Brand: ADVANIX™ BILIARY

## 2022-11-22 RX ORDER — INDOMETHACIN 100 MG
100 SUPPOSITORY, RECTAL RECTAL
Status: COMPLETED | OUTPATIENT
Start: 2022-11-22 | End: 2022-11-22

## 2022-11-22 RX ORDER — LIDOCAINE HYDROCHLORIDE 5 MG/ML
INJECTION, SOLUTION INFILTRATION; INTRAVENOUS AS NEEDED
Status: DISCONTINUED | OUTPATIENT
Start: 2022-11-22 | End: 2022-11-22 | Stop reason: SURG

## 2022-11-22 RX ORDER — FENTANYL CITRATE 50 UG/ML
INJECTION, SOLUTION INTRAMUSCULAR; INTRAVENOUS AS NEEDED
Status: DISCONTINUED | OUTPATIENT
Start: 2022-11-22 | End: 2022-11-22 | Stop reason: SURG

## 2022-11-22 RX ORDER — PROPOFOL 10 MG/ML
VIAL (ML) INTRAVENOUS AS NEEDED
Status: DISCONTINUED | OUTPATIENT
Start: 2022-11-22 | End: 2022-11-22 | Stop reason: SURG

## 2022-11-22 RX ORDER — SODIUM CHLORIDE, SODIUM LACTATE, POTASSIUM CHLORIDE, CALCIUM CHLORIDE 600; 310; 30; 20 MG/100ML; MG/100ML; MG/100ML; MG/100ML
30 INJECTION, SOLUTION INTRAVENOUS CONTINUOUS
Status: DISCONTINUED | OUTPATIENT
Start: 2022-11-22 | End: 2022-11-22 | Stop reason: HOSPADM

## 2022-11-22 RX ADMIN — PROPOFOL 80 MG: 10 INJECTION, EMULSION INTRAVENOUS at 09:38

## 2022-11-22 RX ADMIN — Medication 100 MG: at 08:53

## 2022-11-22 RX ADMIN — FENTANYL CITRATE 50 MCG: 50 INJECTION, SOLUTION INTRAMUSCULAR; INTRAVENOUS at 09:55

## 2022-11-22 RX ADMIN — LIDOCAINE HYDROCHLORIDE 100 MG: 5 INJECTION, SOLUTION INFILTRATION; INTRAVENOUS at 09:38

## 2022-11-22 RX ADMIN — FENTANYL CITRATE 50 MCG: 50 INJECTION, SOLUTION INTRAMUSCULAR; INTRAVENOUS at 09:53

## 2022-11-22 RX ADMIN — PROPOFOL 200 MCG/KG/MIN: 10 INJECTION, EMULSION INTRAVENOUS at 09:38

## 2022-11-22 RX ADMIN — SODIUM CHLORIDE, POTASSIUM CHLORIDE, SODIUM LACTATE AND CALCIUM CHLORIDE 30 ML/HR: 600; 310; 30; 20 INJECTION, SOLUTION INTRAVENOUS at 08:34

## 2022-11-22 RX ADMIN — PROPOFOL 40 MG: 10 INJECTION, EMULSION INTRAVENOUS at 09:40

## 2022-11-22 RX ADMIN — PROPOFOL 100 MG: 10 INJECTION, EMULSION INTRAVENOUS at 09:45

## 2022-11-22 NOTE — ANESTHESIA POSTPROCEDURE EVALUATION
Patient: Loretta Layton    Procedure Summary     Date: 11/22/22 Room / Location: [facility] ENDOSCOPY 4 / [facility] ENDOSCOPY    Anesthesia Start: 0932 Anesthesia Stop: 1014    Procedure: ENDOSCOPIC RETROGRADE CHOLANGIOPANCREATOGRAPHY WITH SPHINCTEROTOMY, BALLOON SWEEP, PANCREATIC DUCT STENT PLACEMENT, BILE DUCT STENT PLACEMENT Diagnosis:       Common bile duct stone      (Common bile duct stone [K80.50])    Surgeons: Will Garcia MD Provider: Abdiel Orosco MD    Anesthesia Type: general ASA Status: 2          Anesthesia Type: general    Vitals  Vitals Value Taken Time   /100 11/22/22 1103   Temp 36.4 °C (97.6 °F) 11/22/22 1014   Pulse 89 11/22/22 1109   Resp 18 11/22/22 1059   SpO2 97 % 11/22/22 1109   Vitals shown include unvalidated device data.        Post Anesthesia Care and Evaluation    Patient location during evaluation: bedside  Patient participation: complete - patient participated  Level of consciousness: awake  Pain management: adequate    Airway patency: patent  Anesthetic complications: No anesthetic complications  PONV Status: none  Cardiovascular status: acceptable and stable  Respiratory status: acceptable  Hydration status: acceptable    Comments: An Anesthesiologist personally participated in the most demanding procedures (including induction and emergence if applicable) in the anesthesia plan, monitored the course of anesthesia administration at frequent intervals and remained physically present and available for immediate diagnosis and treatment of emergencies.

## 2022-11-22 NOTE — ANESTHESIA PREPROCEDURE EVALUATION
Anesthesia Evaluation     Patient summary reviewed and Nursing notes reviewed   no history of anesthetic complications:  NPO Solid Status: > 8 hours  NPO Liquid Status: > 2 hours           Airway   Mallampati: II  TM distance: >3 FB  Neck ROM: full  No difficulty expected  Dental      Pulmonary - negative pulmonary ROS and normal exam    breath sounds clear to auscultation  Cardiovascular - negative cardio ROS and normal exam  Exercise tolerance: good (4-7 METS)    Rhythm: regular  Rate: normal        Neuro/Psych- negative ROS  GI/Hepatic/Renal/Endo - negative ROS     Musculoskeletal (-) negative ROS    Abdominal    Substance History - negative use     OB/GYN negative ob/gyn ROS         Other - negative ROS       ROS/Med Hx Other: PAT Nursing Notes unavailable.                   Anesthesia Plan    ASA 2     general       Anesthetic plan, risks, benefits, and alternatives have been provided, discussed and informed consent has been obtained with: patient and other.        CODE STATUS:

## 2022-11-22 NOTE — H&P
Pre Procedure History & Physical    Chief Complaint:   Bile duct stone    Subjective     HPI:   Common bile duct stone    Past Medical History:   Past Medical History:   Diagnosis Date   • Anxiety    • Broken bones 04/06/2022   • Ketonuria 04/06/2022       Past Surgical History:  Past Surgical History:   Procedure Laterality Date   • CHOLECYSTECTOMY WITH INTRAOPERATIVE CHOLANGIOGRAM N/A 11/20/2022    Procedure: CHOLECYSTECTOMY LAPAROSCOPIC INTRAOPERATIVE CHOLANGIOGRAM;  Surgeon: Luiz Chicas MD;  Location: [facility] MAIN OR;  Service: General;  Laterality: N/A;       Family History:  Family History   Problem Relation Age of Onset   • Malig Hyperthermia Neg Hx        Social History:   reports that she has never smoked. She has never used smokeless tobacco. She reports that she does not currently use alcohol. She reports that she does not use drugs.    Medications:   Medications Prior to Admission   Medication Sig Dispense Refill Last Dose   • buPROPion XL (Wellbutrin XL) 300 MG 24 hr tablet Take 1 tablet by mouth Every Morning. 30 tablet 0    • HYDROcodone-acetaminophen (Norco) 5-325 MG per tablet Take 1 tablet by mouth Every 6 (Six) Hours As Needed for Moderate Pain (NOTE: You can take two tablets instead of one tablet every four hours instead of every six hours if needed for pain). 15 tablet 0    • Liraglutide (Saxenda) 18 MG/3ML injection pen Inject 0.6 mg under the skin into the appropriate area as directed Every 7 (Seven) Days increasing weekly by 0.6 mg until 3.0 mg is achieved. 15 mL 0    • ondansetron ODT (Zofran ODT) 4 MG disintegrating tablet Place 1 tablet on the tongue Every 6 (Six) Hours As Needed for Nausea or Vomiting for up to 10 doses. 8 tablet 0    • propranolol (INDERAL) 10 MG tablet Take 1 tablet by mouth 2 (Two) Times a Day for 30 days. (Patient taking differently: Take 10 mg by mouth 2 (Two) Times a Day As Needed.) 60 tablet 0    • traZODone (DESYREL) 50 MG tablet Take 1-2 tablets by mouth At  Night As Needed. 60 tablet 0        Allergies:  Patient has no known allergies.        Objective     Weight 75.4 kg (166 lb 3.6 oz), last menstrual period 11/01/2022, not currently breastfeeding.    Physical Exam   Constitutional: Pt is oriented to person, place, and time and well-developed, well-nourished, and in no distress.   Mouth/Throat: Oropharynx is clear and moist.   Neck: Normal range of motion.   Cardiovascular: Normal rate, regular rhythm and normal heart sounds.    Pulmonary/Chest: Effort normal and breath sounds normal.   Abdominal: Soft. Nontender  Skin: Skin is warm and dry.   Psychiatric: Mood, memory, affect and judgment normal.     Assessment & Plan     Diagnosis:  Bile duct stone    Anticipated Surgical Procedure:  ERCP    The risks, benefits, and alternatives of this procedure have been discussed with the patient or the responsible party- the patient understands and agrees to proceed.

## 2022-11-22 NOTE — H&P
Pre Procedure History & Physical    Chief Complaint:   Bile duct stones    Subjective     HPI:   Recent biliary colic followed by laparoscopic cholecystectomy with intraoperative cholangiogram showing a bile duct stone stones    Past Medical History:   Past Medical History:   Diagnosis Date   • Anxiety    • Broken bones 04/06/2022   • Ketonuria 04/06/2022       Past Surgical History:  Past Surgical History:   Procedure Laterality Date   • CHOLECYSTECTOMY WITH INTRAOPERATIVE CHOLANGIOGRAM N/A 11/20/2022    Procedure: CHOLECYSTECTOMY LAPAROSCOPIC INTRAOPERATIVE CHOLANGIOGRAM;  Surgeon: Luiz Chicas MD;  Location: [facility] OR;  Service: General;  Laterality: N/A;       Family History:  Family History   Problem Relation Age of Onset   • Malig Hyperthermia Neg Hx        Social History:   reports that she has never smoked. She has never used smokeless tobacco. She reports that she does not currently use alcohol. She reports that she does not use drugs.    Medications:   Medications Prior to Admission   Medication Sig Dispense Refill Last Dose   • buPROPion XL (Wellbutrin XL) 300 MG 24 hr tablet Take 1 tablet by mouth Every Morning. 30 tablet 0    • HYDROcodone-acetaminophen (Norco) 5-325 MG per tablet Take 1 tablet by mouth Every 6 (Six) Hours As Needed for Moderate Pain (NOTE: You can take two tablets instead of one tablet every four hours instead of every six hours if needed for pain). 15 tablet 0    • Liraglutide (Saxenda) 18 MG/3ML injection pen Inject 0.6 mg under the skin into the appropriate area as directed Every 7 (Seven) Days increasing weekly by 0.6 mg until 3.0 mg is achieved. 15 mL 0    • ondansetron ODT (Zofran ODT) 4 MG disintegrating tablet Place 1 tablet on the tongue Every 6 (Six) Hours As Needed for Nausea or Vomiting for up to 10 doses. 8 tablet 0    • propranolol (INDERAL) 10 MG tablet Take 1 tablet by mouth 2 (Two) Times a Day for 30 days. (Patient taking differently: Take 10 mg by mouth 2  (Two) Times a Day As Needed.) 60 tablet 0    • traZODone (DESYREL) 50 MG tablet Take 1-2 tablets by mouth At Night As Needed. 60 tablet 0        Allergies:  Patient has no known allergies.        Objective     Weight 75.4 kg (166 lb 3.6 oz), last menstrual period 11/01/2022, not currently breastfeeding.    Physical Exam   Constitutional: Pt is oriented to person, place, and time and well-developed, well-nourished, and in no distress.   Mouth/Throat: Oropharynx is clear and moist.   Neck: Normal range of motion.   Cardiovascular: Normal rate, regular rhythm and normal heart sounds.    Pulmonary/Chest: Effort normal and breath sounds normal.   Abdominal: Soft. Nontender  Skin: Skin is warm and dry.   Psychiatric: Mood, memory, affect and judgment normal.     Assessment & Plan     Diagnosis:  Common bile duct stones    Anticipated Surgical Procedure:  ERCP    The risks, benefits, and alternatives of this procedure have been discussed with the patient or the responsible party- the patient understands and agrees to proceed.

## 2022-11-23 ENCOUNTER — TELEPHONE (OUTPATIENT)
Dept: SURGERY | Facility: CLINIC | Age: 32
End: 2022-11-23

## 2022-11-23 ENCOUNTER — PREP FOR SURGERY (OUTPATIENT)
Dept: OTHER | Facility: HOSPITAL | Age: 32
End: 2022-11-23

## 2022-11-23 ENCOUNTER — TELEPHONE (OUTPATIENT)
Dept: GASTROENTEROLOGY | Facility: CLINIC | Age: 32
End: 2022-11-23

## 2022-11-23 DIAGNOSIS — K80.20 SYMPTOMATIC CHOLELITHIASIS: Primary | ICD-10-CM

## 2022-11-23 DIAGNOSIS — Z46.89 ENCOUNTER FOR REMOVAL OF PANCREATIC STENT: Primary | ICD-10-CM

## 2022-11-23 DIAGNOSIS — Z46.89 ENCOUNTER FOR REMOVAL OF BILIARY STENT: ICD-10-CM

## 2022-11-23 RX ORDER — OXYCODONE HYDROCHLORIDE AND ACETAMINOPHEN 5; 325 MG/1; MG/1
1 TABLET ORAL EVERY 6 HOURS PRN
Qty: 12 TABLET | Refills: 0 | Status: SHIPPED | OUTPATIENT
Start: 2022-11-23

## 2022-11-23 NOTE — TELEPHONE ENCOUNTER
----- Message from April Olvin sent at 11/23/2022  9:22 AM EST -----  I will Create CR for the repeat now.

## 2022-11-23 NOTE — TELEPHONE ENCOUNTER
CHOLECYSTECTOMY LAPAROSCOPIC INTRAOPERATIVE CHOLANGIOGRAM on 11/20/22.    Patient said she feel pain that feels like a gallbladder attack.  She has epigastric pain and pain in the mid-center back.  The back pain is worse.  The pain comes and goes in waves, and she has to hold her breath until the pain passes.    Norco is not helping for the pain.    How long does this pain last, and is it normal?    She said she also had ERCP with Dr. Garcia, for gallstones in her bile duct.    Pharmacy verified.

## 2022-11-23 NOTE — TELEPHONE ENCOUNTER
I called patient and answered all question and addressed all concerns.  Pain med script.  You do not need to call patient and tell her.

## 2022-11-29 ENCOUNTER — TELEPHONE (OUTPATIENT)
Dept: GASTROENTEROLOGY | Facility: CLINIC | Age: 32
End: 2022-11-29

## 2022-12-06 NOTE — PRE-PROCEDURE INSTRUCTIONS
Pt instructed where to come to and nothing to eat or drink after midnight NO  meds to take in the am and arrival time is 0630 am on 12/8/22 pt has been vaccinated x 2 urine preg ordered

## 2022-12-08 ENCOUNTER — HOSPITAL ENCOUNTER (OUTPATIENT)
Facility: HOSPITAL | Age: 32
Setting detail: HOSPITAL OUTPATIENT SURGERY
Discharge: HOME | End: 2022-12-08
Attending: INTERNAL MEDICINE | Admitting: INTERNAL MEDICINE

## 2022-12-08 ENCOUNTER — ANESTHESIA (OUTPATIENT)
Dept: GASTROENTEROLOGY | Facility: HOSPITAL | Age: 32
End: 2022-12-08

## 2022-12-08 ENCOUNTER — ANESTHESIA EVENT (OUTPATIENT)
Dept: GASTROENTEROLOGY | Facility: HOSPITAL | Age: 32
End: 2022-12-08

## 2022-12-08 ENCOUNTER — APPOINTMENT (OUTPATIENT)
Dept: GENERAL RADIOLOGY | Facility: HOSPITAL | Age: 32
End: 2022-12-08

## 2022-12-08 VITALS
HEART RATE: 86 BPM | WEIGHT: 169.31 LBS | OXYGEN SATURATION: 100 % | BODY MASS INDEX: 31.99 KG/M2 | DIASTOLIC BLOOD PRESSURE: 87 MMHG | SYSTOLIC BLOOD PRESSURE: 113 MMHG | RESPIRATION RATE: 18 BRPM | TEMPERATURE: 98.2 F

## 2022-12-08 DIAGNOSIS — Z46.89 ENCOUNTER FOR REMOVAL OF BILIARY STENT: ICD-10-CM

## 2022-12-08 DIAGNOSIS — Z46.89 ENCOUNTER FOR REMOVAL OF PANCREATIC STENT: ICD-10-CM

## 2022-12-08 LAB — B-HCG UR QL: NEGATIVE

## 2022-12-08 PROCEDURE — 43264 ERCP REMOVE DUCT CALCULI: CPT | Performed by: INTERNAL MEDICINE

## 2022-12-08 PROCEDURE — 43275 ERCP REMOVE FORGN BODY DUCT: CPT | Performed by: INTERNAL MEDICINE

## 2022-12-08 PROCEDURE — C1769 GUIDE WIRE: HCPCS | Performed by: INTERNAL MEDICINE

## 2022-12-08 PROCEDURE — 25010000002 PROPOFOL 10 MG/ML EMULSION: Performed by: NURSE ANESTHETIST, CERTIFIED REGISTERED

## 2022-12-08 PROCEDURE — 43273 ENDOSCOPIC PANCREATOSCOPY: CPT | Performed by: INTERNAL MEDICINE

## 2022-12-08 PROCEDURE — 74330 X-RAY BILE/PANC ENDOSCOPY: CPT

## 2022-12-08 PROCEDURE — 25010000002 FENTANYL CITRATE (PF) 50 MCG/ML SOLUTION: Performed by: NURSE ANESTHETIST, CERTIFIED REGISTERED

## 2022-12-08 PROCEDURE — 81025 URINE PREGNANCY TEST: CPT | Performed by: INTERNAL MEDICINE

## 2022-12-08 RX ORDER — PROPOFOL 10 MG/ML
VIAL (ML) INTRAVENOUS AS NEEDED
Status: DISCONTINUED | OUTPATIENT
Start: 2022-12-08 | End: 2022-12-08 | Stop reason: SURG

## 2022-12-08 RX ORDER — LIDOCAINE HYDROCHLORIDE 20 MG/ML
INJECTION, SOLUTION EPIDURAL; INFILTRATION; INTRACAUDAL; PERINEURAL AS NEEDED
Status: DISCONTINUED | OUTPATIENT
Start: 2022-12-08 | End: 2022-12-08 | Stop reason: SURG

## 2022-12-08 RX ORDER — SODIUM CHLORIDE, SODIUM LACTATE, POTASSIUM CHLORIDE, CALCIUM CHLORIDE 600; 310; 30; 20 MG/100ML; MG/100ML; MG/100ML; MG/100ML
30 INJECTION, SOLUTION INTRAVENOUS CONTINUOUS
Status: DISCONTINUED | OUTPATIENT
Start: 2022-12-08 | End: 2022-12-08 | Stop reason: HOSPADM

## 2022-12-08 RX ORDER — SODIUM CHLORIDE, SODIUM LACTATE, POTASSIUM CHLORIDE, CALCIUM CHLORIDE 600; 310; 30; 20 MG/100ML; MG/100ML; MG/100ML; MG/100ML
1000 INJECTION, SOLUTION INTRAVENOUS CONTINUOUS
Status: DISCONTINUED | OUTPATIENT
Start: 2022-12-08 | End: 2022-12-08 | Stop reason: HOSPADM

## 2022-12-08 RX ORDER — FENTANYL CITRATE 50 UG/ML
INJECTION, SOLUTION INTRAMUSCULAR; INTRAVENOUS AS NEEDED
Status: DISCONTINUED | OUTPATIENT
Start: 2022-12-08 | End: 2022-12-08 | Stop reason: SURG

## 2022-12-08 RX ORDER — INDOMETHACIN 100 MG
100 SUPPOSITORY, RECTAL RECTAL ONCE
Status: DISCONTINUED | OUTPATIENT
Start: 2022-12-08 | End: 2022-12-08 | Stop reason: HOSPADM

## 2022-12-08 RX ADMIN — FENTANYL CITRATE 50 MCG: 50 INJECTION, SOLUTION INTRAMUSCULAR; INTRAVENOUS at 07:42

## 2022-12-08 RX ADMIN — LIDOCAINE HYDROCHLORIDE 100 MG: 20 INJECTION, SOLUTION EPIDURAL; INFILTRATION; INTRACAUDAL; PERINEURAL at 07:44

## 2022-12-08 RX ADMIN — PROPOFOL 50 MG: 10 INJECTION, EMULSION INTRAVENOUS at 07:44

## 2022-12-08 RX ADMIN — PROPOFOL 200 MCG/KG/MIN: 10 INJECTION, EMULSION INTRAVENOUS at 07:44

## 2022-12-08 RX ADMIN — SODIUM CHLORIDE, POTASSIUM CHLORIDE, SODIUM LACTATE AND CALCIUM CHLORIDE: 600; 310; 30; 20 INJECTION, SOLUTION INTRAVENOUS at 07:38

## 2022-12-08 RX ADMIN — SODIUM CHLORIDE, POTASSIUM CHLORIDE, SODIUM LACTATE AND CALCIUM CHLORIDE 30 ML/HR: 600; 310; 30; 20 INJECTION, SOLUTION INTRAVENOUS at 07:06

## 2022-12-08 RX ADMIN — SODIUM CHLORIDE, POTASSIUM CHLORIDE, SODIUM LACTATE AND CALCIUM CHLORIDE 1000 ML: 600; 310; 30; 20 INJECTION, SOLUTION INTRAVENOUS at 08:35

## 2022-12-08 RX ADMIN — SODIUM CHLORIDE, POTASSIUM CHLORIDE, SODIUM LACTATE AND CALCIUM CHLORIDE 1000 ML: 600; 310; 30; 20 INJECTION, SOLUTION INTRAVENOUS at 09:34

## 2022-12-08 RX ADMIN — FENTANYL CITRATE 50 MCG: 50 INJECTION, SOLUTION INTRAMUSCULAR; INTRAVENOUS at 07:48

## 2022-12-08 NOTE — H&P
Pre Procedure History & Physical    Chief Complaint:   Bile duct stone and stent    Subjective     HPI:   History of bile duct stone and stent    Past Medical History:   Past Medical History:   Diagnosis Date   • Anxiety    • Broken bones 04/06/2022   • Ketonuria 04/06/2022   • Pancreatitis 11/22/2022    POST ERCP W/STENT PLACEMENT       Past Surgical History:  Past Surgical History:   Procedure Laterality Date   • CHOLECYSTECTOMY WITH INTRAOPERATIVE CHOLANGIOGRAM N/A 11/20/2022    Procedure: CHOLECYSTECTOMY LAPAROSCOPIC INTRAOPERATIVE CHOLANGIOGRAM;  Surgeon: uLiz Chicas MD;  Location: [facility] MAIN OR;  Service: General;  Laterality: N/A;   • ERCP N/A 11/22/2022    Procedure: ENDOSCOPIC RETROGRADE CHOLANGIOPANCREATOGRAPHY WITH SPHINCTEROTOMY, BALLOON SWEEP, PANCREATIC DUCT STENT PLACEMENT, BILE DUCT STENT PLACEMENT;  Surgeon: Will Garcia MD;  Location: [facility] ENDOSCOPY;  Service: Gastroenterology;  Laterality: N/A;  BILE DUCT STONES       Family History:  Family History   Problem Relation Age of Onset   • Malig Hyperthermia Neg Hx        Social History:   reports that she has never smoked. She has never used smokeless tobacco. She reports that she does not currently use alcohol. She reports that she does not use drugs.    Medications:   Medications Prior to Admission   Medication Sig Dispense Refill Last Dose   • buPROPion XL (Wellbutrin XL) 300 MG 24 hr tablet Take 1 tablet by mouth Every Morning. (Patient taking differently: Take 300 mg by mouth Every Night.) 30 tablet 0 12/7/2022   • oxyCODONE-acetaminophen (Percocet) 5-325 MG per tablet Take 1 tablet by mouth Every 6 (Six) Hours As Needed for Moderate Pain or Severe Pain. (Patient taking differently: Take 1 tablet by mouth Every 6 (Six) Hours As Needed for Moderate Pain or Severe Pain. Not taking) 12 tablet 0 Past Month   • propranolol (INDERAL) 10 MG tablet Take 1 tablet by mouth 2 (Two) Times a Day for 30 days. (Patient taking differently: Take  10 mg by mouth 2 (Two) Times a Day As Needed.) 60 tablet 0 Past Month   • traZODone (DESYREL) 50 MG tablet Take 1-2 tablets by mouth At Night As Needed. 60 tablet 0 More than a month       Allergies:  Patient has no known allergies.        Objective     Blood pressure 111/78, pulse 94, temperature 97.4 °F (36.3 °C), temperature source Temporal, resp. rate 16, weight 76.8 kg (169 lb 5 oz), last menstrual period 11/15/2022, SpO2 95 %, not currently breastfeeding.    Physical Exam   Constitutional: Pt is oriented to person, place, and time and well-developed, well-nourished, and in no distress.   Mouth/Throat: Oropharynx is clear and moist.   Neck: Normal range of motion.   Cardiovascular: Normal rate, regular rhythm and normal heart sounds.    Pulmonary/Chest: Effort normal and breath sounds normal.   Abdominal: Soft. Nontender  Skin: Skin is warm and dry.   Psychiatric: Mood, memory, affect and judgment normal.     Assessment & Plan     Diagnosis:  History of bile duct stone and stent    Anticipated Surgical Procedure:  ERCP    The risks, benefits, and alternatives of this procedure have been discussed with the patient or the responsible party- the patient understands and agrees to proceed.

## 2022-12-08 NOTE — ANESTHESIA POSTPROCEDURE EVALUATION
Patient: Loretta Layton    Procedure Summary     Date: 12/08/22 Room / Location: [facility] ENDOSCOPY 4 / [facility] ENDOSCOPY    Anesthesia Start: 0738 Anesthesia Stop: 0815    Procedure: ENDOSCOPIC RETROGRADE CHOLANGIOPANCREATOGRAPHY with balloon dilation 9-12 , stent removal Diagnosis:       Encounter for removal of pancreatic stent      Encounter for removal of biliary stent      (Encounter for removal of pancreatic stent [Z46.89])      (Encounter for removal of biliary stent [Z46.89])    Surgeons: Will Garcia MD Provider: Guy Sawant MD    Anesthesia Type: general ASA Status: 1          Anesthesia Type: general    Vitals  Vitals Value Taken Time   /87 12/08/22 1112   Temp 36.8 °C (98.2 °F) 12/08/22 0910   Pulse 73 12/08/22 1113   Resp 18 12/08/22 1112   SpO2 100 % 12/08/22 1112   Vitals shown include unvalidated device data.        Post Anesthesia Care and Evaluation    Patient location during evaluation: bedside  Patient participation: complete - patient participated  Level of consciousness: awake  Pain management: adequate    Airway patency: patent  Anesthetic complications: No anesthetic complications  PONV Status: none  Cardiovascular status: acceptable and stable  Respiratory status: acceptable  Hydration status: acceptable    Comments: An Anesthesiologist personally participated in the most demanding procedures (including induction and emergence if applicable) in the anesthesia plan, monitored the course of anesthesia administration at frequent intervals and remained physically present and available for immediate diagnosis and treatment of emergencies.

## 2022-12-12 ENCOUNTER — OFFICE VISIT (OUTPATIENT)
Dept: SURGERY | Facility: CLINIC | Age: 32
End: 2022-12-12

## 2022-12-12 VITALS — RESPIRATION RATE: 14 BRPM | HEIGHT: 61 IN | BODY MASS INDEX: 32.21 KG/M2 | WEIGHT: 170.6 LBS

## 2022-12-12 DIAGNOSIS — Z09 ENCOUNTER FOR FOLLOW-UP: Primary | ICD-10-CM

## 2022-12-12 PROCEDURE — 99024 POSTOP FOLLOW-UP VISIT: CPT | Performed by: SURGERY

## 2022-12-12 NOTE — PROGRESS NOTES
Patient is here for follow-up after laparoscopic gallbladder removal on 11/20/2022.  Indication was acute cholecystitis.  Cholangiogram showed stones in the common bile duct.  ERCP was done by Dr. Garcia on 12/8/22.    Patient is doing well and has no complaints or concerns.  Abdominal exam is benign.  Incisions are healing well.  The patient is already had the common bile duct stent removed.    My assessment is the patient has recovered satisfactorily from surgery for symptomatic gallstones.  She is doing well.  No new issues to address.  Patient seems pleased with her outcome.  She can see me PRN.

## 2022-12-14 ENCOUNTER — TELEPHONE (OUTPATIENT)
Dept: GASTROENTEROLOGY | Facility: CLINIC | Age: 32
End: 2022-12-14

## 2022-12-14 NOTE — TELEPHONE ENCOUNTER
ERCP 12/8/2022: Stent removed from the bile duct, patient may follow-up as needed, can send letter  Reviewed Dr. Chicas's note from 12/12/2022 and he notes patient is doing well without any complaints or concerns.  She is also following up with him as needed.

## 2023-04-24 ENCOUNTER — LAB (OUTPATIENT)
Dept: LAB | Facility: HOSPITAL | Age: 33
End: 2023-04-24
Payer: COMMERCIAL

## 2023-04-24 DIAGNOSIS — R10.84 GENERALIZED ABDOMINAL PAIN: Primary | ICD-10-CM

## 2023-04-24 DIAGNOSIS — R10.84 GENERALIZED ABDOMINAL PAIN: ICD-10-CM

## 2023-04-24 LAB
ALBUMIN SERPL-MCNC: 4.2 G/DL (ref 3.5–5.2)
ALP SERPL-CCNC: 76 U/L (ref 39–117)
ALT SERPL W P-5'-P-CCNC: 30 U/L (ref 1–33)
ANION GAP SERPL CALCULATED.3IONS-SCNC: 10.1 MMOL/L (ref 5–15)
AST SERPL-CCNC: 27 U/L (ref 1–32)
BASOPHILS # BLD AUTO: 0.03 10*3/MM3 (ref 0–0.2)
BASOPHILS NFR BLD AUTO: 0.5 % (ref 0–1.5)
BILIRUB CONJ SERPL-MCNC: <0.2 MG/DL (ref 0–0.3)
BILIRUB INDIRECT SERPL-MCNC: NORMAL MG/DL
BILIRUB SERPL-MCNC: 0.3 MG/DL (ref 0–1.2)
BUN SERPL-MCNC: 14 MG/DL (ref 6–20)
BUN/CREAT SERPL: 19.4 (ref 7–25)
CALCIUM SPEC-SCNC: 9.5 MG/DL (ref 8.6–10.5)
CHLORIDE SERPL-SCNC: 105 MMOL/L (ref 98–107)
CO2 SERPL-SCNC: 26.9 MMOL/L (ref 22–29)
CREAT SERPL-MCNC: 0.72 MG/DL (ref 0.57–1)
DEPRECATED RDW RBC AUTO: 40.3 FL (ref 37–54)
EGFRCR SERPLBLD CKD-EPI 2021: 112.7 ML/MIN/1.73
EOSINOPHIL # BLD AUTO: 0.23 10*3/MM3 (ref 0–0.4)
EOSINOPHIL NFR BLD AUTO: 3.9 % (ref 0.3–6.2)
ERYTHROCYTE [DISTWIDTH] IN BLOOD BY AUTOMATED COUNT: 12.9 % (ref 12.3–15.4)
GLUCOSE SERPL-MCNC: 78 MG/DL (ref 65–99)
HCT VFR BLD AUTO: 37.6 % (ref 34–46.6)
HGB BLD-MCNC: 12.7 G/DL (ref 12–15.9)
IMM GRANULOCYTES # BLD AUTO: 0.01 10*3/MM3 (ref 0–0.05)
IMM GRANULOCYTES NFR BLD AUTO: 0.2 % (ref 0–0.5)
LYMPHOCYTES # BLD AUTO: 1.94 10*3/MM3 (ref 0.7–3.1)
LYMPHOCYTES NFR BLD AUTO: 33 % (ref 19.6–45.3)
MCH RBC QN AUTO: 29.5 PG (ref 26.6–33)
MCHC RBC AUTO-ENTMCNC: 33.8 G/DL (ref 31.5–35.7)
MCV RBC AUTO: 87.4 FL (ref 79–97)
MONOCYTES # BLD AUTO: 0.39 10*3/MM3 (ref 0.1–0.9)
MONOCYTES NFR BLD AUTO: 6.6 % (ref 5–12)
NEUTROPHILS NFR BLD AUTO: 3.28 10*3/MM3 (ref 1.7–7)
NEUTROPHILS NFR BLD AUTO: 55.8 % (ref 42.7–76)
NRBC BLD AUTO-RTO: 0 /100 WBC (ref 0–0.2)
PLATELET # BLD AUTO: 192 10*3/MM3 (ref 140–450)
PMV BLD AUTO: 11.6 FL (ref 6–12)
POTASSIUM SERPL-SCNC: 3.8 MMOL/L (ref 3.5–5.2)
PROT SERPL-MCNC: 8.3 G/DL (ref 6–8.5)
RBC # BLD AUTO: 4.3 10*6/MM3 (ref 3.77–5.28)
SODIUM SERPL-SCNC: 142 MMOL/L (ref 136–145)
WBC NRBC COR # BLD: 5.88 10*3/MM3 (ref 3.4–10.8)

## 2023-04-24 PROCEDURE — 80076 HEPATIC FUNCTION PANEL: CPT

## 2023-04-24 PROCEDURE — 36415 COLL VENOUS BLD VENIPUNCTURE: CPT

## 2023-04-24 PROCEDURE — 85025 COMPLETE CBC W/AUTO DIFF WBC: CPT

## 2023-04-24 PROCEDURE — 80048 BASIC METABOLIC PNL TOTAL CA: CPT

## 2023-04-27 ENCOUNTER — TELEPHONE (OUTPATIENT)
Dept: SURGERY | Facility: CLINIC | Age: 33
End: 2023-04-27
Payer: COMMERCIAL

## 2023-04-27 ENCOUNTER — HOSPITAL ENCOUNTER (OUTPATIENT)
Dept: MRI IMAGING | Facility: HOSPITAL | Age: 33
Discharge: HOME | End: 2023-04-27
Payer: COMMERCIAL

## 2023-04-27 DIAGNOSIS — Z90.49 S/P LAPAROSCOPIC CHOLECYSTECTOMY: Primary | ICD-10-CM

## 2023-04-27 DIAGNOSIS — Z90.49 S/P LAPAROSCOPIC CHOLECYSTECTOMY: ICD-10-CM

## 2023-04-27 DIAGNOSIS — R10.11 RIGHT UPPER QUADRANT ABDOMINAL PAIN: ICD-10-CM

## 2023-04-27 PROCEDURE — 0 GADOBENATE DIMEGLUMINE 529 MG/ML SOLUTION: Performed by: SURGERY

## 2023-04-27 PROCEDURE — A9577 INJ MULTIHANCE: HCPCS | Performed by: SURGERY

## 2023-04-27 PROCEDURE — 74183 MRI ABD W/O CNTR FLWD CNTR: CPT

## 2023-04-27 RX ADMIN — GADOBENATE DIMEGLUMINE 15 ML: 529 INJECTION, SOLUTION INTRAVENOUS at 18:18

## 2023-04-27 NOTE — TELEPHONE ENCOUNTER
"----- Message from Luiz Chicas MD sent at 4/27/2023 11:25 AM EDT -----  Please order MRCP and then call the patient and let her know when to get it done.  For the indication put \"status post gallbladder removal and ercp removal of common bile duct stones, having right upper quadrant abdominal pain, evaluate for additional common bile duct stones\"    Thank you.    "

## 2023-04-27 NOTE — TELEPHONE ENCOUNTER
I have scheduled MRCP for today, 4-27-23 at 5pm at [facility]. Arrival time of 4:45pm.  Pt aware and V/U.

## 2024-08-21 NOTE — PROGRESS NOTES
"New Patient Well Woman Visit    CC: Scheduled annual well gyn visit  Chief Complaint   Patient presents with    Gynecologic Exam         HPI:   34 y.o. who presents today for annual exam. Patient states periods are regular. States they are heavy and painful.  She is sexually active. She denies any H/O STDS. She denies any family H/O breast, uterine or ovarian cancer. She denies any vaginal odor, vaginal discharge, dysuria/hematuria, F/C, D/C, N/V, CP or SOB.     History: PMHx, Meds, Allergies, PSHx, Social Hx, and POBHx all reviewed and updated.  Last Pap :   Last Completed Pap Smear       This patient has no relevant Health Maintenance data.          Last MMG :   Last Completed Mammogram       This patient has no relevant Health Maintenance data.           Last Colonoscopy :   Last Completed Colonoscopy       This patient has no relevant Health Maintenance data.            ROS:  General ROS: negative for chills or fatigue  Breast ROS: negative new or changing breast lumps  Gastrointestinal ROS: negative for abdominal pain or appetite loss  Genito-Urinary ROS: negative for difficulty in urination or vaginal irritation   Psych ROS: negative for depression      PHYSICAL EXAM:      /86   Pulse 98   Ht 154.9 cm (61\")   Wt 56.2 kg (124 lb)   LMP 2024   BMI 23.43 kg/m²  Not found.    General- NAD, alert and oriented, appropriate  Psych- Normal mood, good memory  Resp- No labored breathing  Abdomen- Soft, non distended, non tender    Breast Exam: Breast self awareness counseled  Breast left-  Bilaterally symmetrical, no masses, non tender, no nipple discharge  Breast right- Bilaterally symmetrical, no masses, non tender, no nipple discharge    Pelvic Exam with chaperone present:   External genitalia- Normal female, no lesions  Urethra/meatus- Normal, no masses, non tender  Bladder- Normal, no masses, non tender  Vagina- Normal, no atrophy, no lesions, no discharge.    Cvx- Normal, no lesions, no " discharge, No cervical motion tenderness  Uterus- Normal size, shape & consistency.  Non tender, mobile.  Adnexa- No mass, non tender  Anus/Rectum/Perineum- Not performed    Ext- No edema  Skin- No lesions, no rashes, no acanthosis nigricans      ASSESSMENT and PLAN:    Diagnoses and all orders for this visit:    1. Well woman exam with routine gynecological exam (Primary)  -     IgP, Aptima HPV    2. Pap test, as part of routine gynecological examination  -     IgP, Aptima HPV    3. Menses painful  -     norethindrone (MICRONOR) 0.35 MG tablet; Take 1 tablet by mouth Daily.  Dispense: 28 tablet; Refill: 11        Preventative:  1. Annuals every year; Cytology collections per prevailing guidelines.   2. Mammograms begin every year at 39 yo if no abnormalities are found and no family history.  3. Bone density studies begin at 66 yo. If no fracture history or osteoporosis family history.  4. Colonoscopy begins at 46 yo. Repeat every ten years if negative and no family history.  5. Calcium of 5568-7776 mg/day in split dosing  6. Vitamin D 400-800 IU/day  7. All other preventative health recommendations will be managed by the patients Primary care physician.    She understands the importance of having any ordered tests to be performed in a timely fashion.  The risks of not performing them include, but are not limited to, advanced cancer stages, bone loss from osteoporosis and/or subsequent increase in morbidity and/or mortality.  She is encouraged to review her results online and/or contact or office if she has questions.     Follow Up:  Return in about 1 year (around 8/22/2025) for Annual physical.            Yusra Sheth DO  08/22/2024    [facility]  [address] [place] [address]  Dept: [phone]  Dept Fax: [phone]  Loc: [phone]  Loc Fax: [phone]

## 2024-08-22 ENCOUNTER — OFFICE VISIT (OUTPATIENT)
Dept: OBSTETRICS AND GYNECOLOGY | Facility: CLINIC | Age: 34
End: 2024-08-22
Payer: COMMERCIAL

## 2024-08-22 VITALS
HEIGHT: 61 IN | HEART RATE: 98 BPM | DIASTOLIC BLOOD PRESSURE: 86 MMHG | SYSTOLIC BLOOD PRESSURE: 124 MMHG | BODY MASS INDEX: 23.41 KG/M2 | WEIGHT: 124 LBS

## 2024-08-22 DIAGNOSIS — N94.6 MENSES PAINFUL: ICD-10-CM

## 2024-08-22 DIAGNOSIS — Z01.419 WELL WOMAN EXAM WITH ROUTINE GYNECOLOGICAL EXAM: Primary | ICD-10-CM

## 2024-08-22 DIAGNOSIS — Z01.419 PAP TEST, AS PART OF ROUTINE GYNECOLOGICAL EXAMINATION: ICD-10-CM

## 2024-08-22 PROCEDURE — G0123 SCREEN CERV/VAG THIN LAYER: HCPCS | Performed by: STUDENT IN AN ORGANIZED HEALTH CARE EDUCATION/TRAINING PROGRAM

## 2024-08-22 PROCEDURE — 87624 HPV HI-RISK TYP POOLED RSLT: CPT | Performed by: STUDENT IN AN ORGANIZED HEALTH CARE EDUCATION/TRAINING PROGRAM

## 2024-08-22 RX ORDER — ACETAMINOPHEN AND CODEINE PHOSPHATE 120; 12 MG/5ML; MG/5ML
1 SOLUTION ORAL DAILY
Qty: 28 TABLET | Refills: 11 | Status: SHIPPED | OUTPATIENT
Start: 2024-08-22 | End: 2025-08-22

## 2024-08-28 LAB
CYTOLOGIST CVX/VAG CYTO: ABNORMAL
CYTOLOGY CVX/VAG DOC CYTO: ABNORMAL
CYTOLOGY CVX/VAG DOC THIN PREP: ABNORMAL
DX ICD CODE: ABNORMAL
DX ICD CODE: ABNORMAL
HPV I/H RISK 4 DNA CVX QL PROBE+SIG AMP: NEGATIVE
Lab: ABNORMAL
OTHER STN SPEC: ABNORMAL
PATHOLOGIST CVX/VAG CYTO: ABNORMAL
STAT OF ADQ CVX/VAG CYTO-IMP: ABNORMAL

## 2024-11-08 RX ORDER — AZITHROMYCIN 250 MG/1
TABLET, FILM COATED ORAL
Qty: 6 TABLET | Refills: 0 | Status: SHIPPED | OUTPATIENT
Start: 2024-11-08

## 2025-03-20 RX ORDER — OFLOXACIN 3 MG/ML
1 SOLUTION/ DROPS OPHTHALMIC 4 TIMES DAILY
Qty: 1 ML | Refills: 0 | Status: SHIPPED | OUTPATIENT
Start: 2025-03-20 | End: 2025-03-25

## (undated) DEVICE — APPL CHLORAPREP HI/LITE 26ML ORNG

## (undated) DEVICE — LINER SURG CANSTR SXN S/RIGD 1500CC

## (undated) DEVICE — MARYLAND JAW LAPAROSCOPIC SEALER/DIVIDER COATED: Brand: LIGASURE

## (undated) DEVICE — SUT VIC PLS CTD BR 0 TIE 18IN VIL

## (undated) DEVICE — 3 RING SUTURE PASSER - 16 CM: Brand: 3 RING SUTURE PASSER - 16 CM

## (undated) DEVICE — GW JAG STR .035IN 450CM

## (undated) DEVICE — ENDOPATH XCEL WITH OPTIVIEW TECHNOLOGY UNIVERSAL TROCAR STABILITY SLEEVES: Brand: ENDOPATH XCEL OPTIVIEW

## (undated) DEVICE — SOL IRR H2O BTL 1000ML STRL

## (undated) DEVICE — SPHINCT CANNULATOME2 2L DOME/TP .035IN 6F 2.8MM 200CM

## (undated) DEVICE — ROTATING HEMOSTATIC VALVE .096": Brand: RHV

## (undated) DEVICE — GLV SURG BIOGEL LTX PF 7 1/2

## (undated) DEVICE — CONN JET HYDRA H20 AUXILIARY DISP

## (undated) DEVICE — DEV LK WIREGUIDE FUSN OLYMP SCP

## (undated) DEVICE — ENDOPATH XCEL WITH OPTIVIEW TECHNOLOGY BLADELESS TROCARS WITH STABILITY SLEEVES: Brand: ENDOPATH XCEL OPTIVIEW

## (undated) DEVICE — SOLIDIFIER LIQLOC PLS 1500CC BT

## (undated) DEVICE — LAPAROSCOPIC DISSECTOR: Brand: DEROYAL

## (undated) DEVICE — RETRIEVAL BALLOON CATHETER: Brand: EXTRACTOR™ PRO RX

## (undated) DEVICE — INTENDED FOR TISSUE SEPARATION, AND OTHER PROCEDURES THAT REQUIRE A SHARP SURGICAL BLADE TO PUNCTURE OR CUT.: Brand: BARD-PARKER ® CARBON RIB-BACK BLADES

## (undated) DEVICE — GW HIPRFM BIL JAGWIRE REVOLUTION STR .025IN 450CM

## (undated) DEVICE — SLV SCD KN/LEN ADJ EXPRSS BLENDED MD 1P/U

## (undated) DEVICE — BLCK/BITE BLOX WO/DENTL/RIM W/STRAP 54F

## (undated) DEVICE — SNAR POLYP CAPTIVATOR HEX 27MM 240CM

## (undated) DEVICE — Device

## (undated) DEVICE — 3M™ STERI-DRAPE™ X-RAY IMAGE INTENSIFIER DRAPE, 10 PER CARTON / 4 CARTONS PER CASE, 1013: Brand: STERI-DRAPE™

## (undated) DEVICE — PENCL E/S HNDSWCH ROCKR CB

## (undated) DEVICE — Device: Brand: DEFENDO AIR/WATER/SUCTION AND BIOPSY VALVE

## (undated) DEVICE — ADHS SKIN SURG TISS VISC PREMIERPRO EXOFIN HI/VISC FAST/DRY

## (undated) DEVICE — CONTAINER,SPECIMEN,O.R.STRL,4.5OZ: Brand: MEDLINE

## (undated) DEVICE — GENERAL LAPAROSCOPY-LF: Brand: MEDLINE INDUSTRIES, INC.

## (undated) DEVICE — SOL IRR NACL 0.9PCT BT 1000ML

## (undated) DEVICE — RX DELIVERY SYSTEM: Brand: NAVIFLEX™ RX DELIVERY SYSTEM

## (undated) DEVICE — SUT MNCRYL 4/0 PS2 18 IN

## (undated) DEVICE — GOWN,REINFORCE,POLY,SIRUS,BREATH SLV,XLG: Brand: MEDLINE

## (undated) DEVICE — LAPAROVUE VISIBILITY SYSTEM LAPAROSCOPIC SOLUTIONS: Brand: LAPAROVUE

## (undated) DEVICE — THE KUMAR CATHETER®, USED IN CONJUNCTION WITH KUMAR CHOLANGIOGRAPHY® CLAMP, IS MEANT TO PROVIDE A MEANS OF LAPAROSCOPIC CHOLANGIOGRAPHY. IT COMPRISES A TRANSLUCENT TUBING ( 76 CM. LENGTH AND 16 GA. ) THAT CARRIES A 19 GA., 1.25 CM LONG NEEDLE AT THE END. THE KUMAR CATHETER® IS USED TO PUNCTURE THE HARTMANN'S POUCH OF THE GALLBLADDER FOR BILIARY ACCESS AND / OR ASPIRATION. PRODUCT IS LATEX FREE.: Brand: KUMAR CATHETER®

## (undated) DEVICE — TISSUE RETRIEVAL SYSTEM: Brand: INZII RETRIEVAL SYSTEM

## (undated) DEVICE — SYR LUERLOK 30CC

## (undated) DEVICE — LAPAROSCOPIC SMOKE EVACUATION SYSTEM ACTIVE AND PASSIVE: Brand: VALLEYLAB

## (undated) DEVICE — SOL NACL 0.9PCT 1000ML

## (undated) DEVICE — LAPAROSCOPIC ELECTRODE WITH A 3/32" PIN CONNECTOR, L-HOOK 5 MM X 27 CM: Brand: CONMED

## (undated) DEVICE — ENDOPATH PNEUMONEEDLE INSUFFLATION NEEDLES WITH LUER LOCK CONNECTORS 120MM: Brand: ENDOPATH

## (undated) DEVICE — 2, DISPOSABLE SUCTION/IRRIGATOR WITHOUT DISPOSABLE TIP: Brand: STRYKEFLOW

## (undated) DEVICE — ENDOPATH ETS-FLEX45 ARTICULATING ENDOSCOPIC LINEAR CUTTER, NO RELOAD: Brand: ENDOPATH

## (undated) DEVICE — DECANTER: Brand: UNBRANDED

## (undated) DEVICE — ROTATING HEMOSTATIC VALVE: Brand: ROTATING HEMOSTATIC VALVE